# Patient Record
Sex: FEMALE | Race: WHITE | NOT HISPANIC OR LATINO | Employment: FULL TIME | URBAN - METROPOLITAN AREA
[De-identification: names, ages, dates, MRNs, and addresses within clinical notes are randomized per-mention and may not be internally consistent; named-entity substitution may affect disease eponyms.]

---

## 2017-02-10 ENCOUNTER — TELEPHONE (OUTPATIENT)
Dept: PEDIATRICS | Facility: CLINIC | Age: 32
End: 2017-02-10

## 2017-02-10 NOTE — TELEPHONE ENCOUNTER
Patient came into clinic thinking she had an appointment today.  When she was told she scheduled her appointment on the wrong day she was told she could see a triage nurse.    Patient made her appointment through [employer] this morning and she thought  The appointment was today 2/10/17 but it was for 3/10/17.  Appointment notes:  I have redness, swelling, and some pain along the base  of my left big toenail. I believe it is infected.  Also, the nail has not been growing.     Spoke to patient informed her that as an RN I can take a look at her leg and tell her if she needs to be seen by a doctor but I cannot treat her or diagnose her leg infection.  She was very upset and thought I could do something for her.  She told me that this was a waste of time, she knows she has an infection her big toe is bright red. Offered her an appt with Dr. Kellogg Monday was the earliest.  She said no she cant wait that long.  Looked online for wait times at the [facility] at it was 134min.  She lives in [place].  Told her that [facility] had urgent care in [place] and [facility] but they did not have any wait times on their website.  She will go to urgent care to be seen as she needs to be seen.      Keyonna Cervantes RN,   [facility]

## 2018-01-03 ENCOUNTER — OFFICE VISIT (OUTPATIENT)
Dept: PEDIATRICS | Facility: CLINIC | Age: 33
End: 2018-01-03
Payer: COMMERCIAL

## 2018-01-03 VITALS
HEART RATE: 97 BPM | OXYGEN SATURATION: 99 % | BODY MASS INDEX: 33.99 KG/M2 | HEIGHT: 65 IN | WEIGHT: 204 LBS | SYSTOLIC BLOOD PRESSURE: 122 MMHG | DIASTOLIC BLOOD PRESSURE: 80 MMHG

## 2018-01-03 DIAGNOSIS — Z30.011 ENCOUNTER FOR INITIAL PRESCRIPTION OF CONTRACEPTIVE PILLS: ICD-10-CM

## 2018-01-03 DIAGNOSIS — Z00.00 ROUTINE GENERAL MEDICAL EXAMINATION AT A HEALTH CARE FACILITY: Primary | ICD-10-CM

## 2018-01-03 DIAGNOSIS — R73.03 PREDIABETES: ICD-10-CM

## 2018-01-03 DIAGNOSIS — Z13.220 LIPID SCREENING: ICD-10-CM

## 2018-01-03 DIAGNOSIS — E78.5 HYPERLIPIDEMIA LDL GOAL <160: ICD-10-CM

## 2018-01-03 DIAGNOSIS — E66.9 NON MORBID OBESITY, UNSPECIFIED OBESITY TYPE: ICD-10-CM

## 2018-01-03 DIAGNOSIS — E78.1 HYPERTRIGLYCERIDEMIA: ICD-10-CM

## 2018-01-03 DIAGNOSIS — E28.2 PCOS (POLYCYSTIC OVARIAN SYNDROME): ICD-10-CM

## 2018-01-03 LAB
CHOLEST SERPL-MCNC: 171 MG/DL
GLUCOSE SERPL-MCNC: 129 MG/DL (ref 70–99)
HBA1C MFR BLD: 5.6 % (ref 4.3–6)
HDLC SERPL-MCNC: 34 MG/DL
LDLC SERPL CALC-MCNC: ABNORMAL MG/DL
LDLC SERPL DIRECT ASSAY-MCNC: 78 MG/DL
NONHDLC SERPL-MCNC: 137 MG/DL
TRIGL SERPL-MCNC: 457 MG/DL

## 2018-01-03 PROCEDURE — 82947 ASSAY GLUCOSE BLOOD QUANT: CPT | Performed by: INTERNAL MEDICINE

## 2018-01-03 PROCEDURE — 99213 OFFICE O/P EST LOW 20 MIN: CPT | Mod: 25 | Performed by: INTERNAL MEDICINE

## 2018-01-03 PROCEDURE — 80061 LIPID PANEL: CPT | Performed by: INTERNAL MEDICINE

## 2018-01-03 PROCEDURE — 83036 HEMOGLOBIN GLYCOSYLATED A1C: CPT | Performed by: INTERNAL MEDICINE

## 2018-01-03 PROCEDURE — 36415 COLL VENOUS BLD VENIPUNCTURE: CPT | Performed by: INTERNAL MEDICINE

## 2018-01-03 PROCEDURE — 83721 ASSAY OF BLOOD LIPOPROTEIN: CPT | Mod: 59 | Performed by: INTERNAL MEDICINE

## 2018-01-03 PROCEDURE — 99395 PREV VISIT EST AGE 18-39: CPT | Performed by: INTERNAL MEDICINE

## 2018-01-03 RX ORDER — DESOGESTREL AND ETHINYL ESTRADIOL 0.15-0.03
1 KIT ORAL DAILY
Qty: 84 TABLET | Refills: 3 | Status: SHIPPED | OUTPATIENT
Start: 2018-01-03 | End: 2019-01-04

## 2018-01-03 RX ORDER — METFORMIN HCL 500 MG
500 TABLET, EXTENDED RELEASE 24 HR ORAL 2 TIMES DAILY WITH MEALS
Qty: 60 TABLET | Refills: 0 | Status: SHIPPED | OUTPATIENT
Start: 2018-01-03 | End: 2018-02-02

## 2018-01-03 NOTE — MR AVS SNAPSHOT
After Visit Summary   1/3/2018    Nancie Gonzalez    MRN: [id number]           Patient Information     Date Of Birth          1985        Visit Information        Provider Department      1/3/2018 8:10 AM Pauline Kellogg MD PhD [facility]        Today's Diagnoses     Routine general medical examination at a health care facility    -  1    Prediabetes        PCOS (polycystic ovarian syndrome)        Encounter for initial prescription of contraceptive pills        Lipid screening          Care Instructions    Make appointment(s) for:   -- get labs done today.         Medication(s) prescribed today:    Orders Placed This Encounter   Medications     metFORMIN (GLUCOPHAGE-XR) 500 MG 24 hr tablet     Sig: Take 1 tablet (500 mg) by mouth 2 times daily (with meals)     Dispense:  60 tablet     Refill:  0     desogestrel-ethinyl estradiol (APRI) 0.15-30 MG-MCG per tablet     Sig: Take 1 tablet by mouth daily     Dispense:  84 tablet     Refill:  3           Preventive Health Recommendations  Female Ages 26 - 39  Yearly exam:   See your health care provider every year in order to    Review health changes.     Discuss preventive care.      Review your medicines if you your doctor has prescribed any.    Until age 30: Get a Pap test every three years (more often if you have had an abnormal result).    After age 30: Talk to your doctor about whether you should have a Pap test every 3 years or have a Pap test with HPV screening every 5 years.   You do not need a Pap test if your uterus was removed (hysterectomy) and you have not had cancer.  You should be tested each year for STDs (sexually transmitted diseases), if you're at risk.   Talk to your provider about how often to have your cholesterol checked.  If you are at risk for diabetes, you should have a diabetes test (fasting glucose).  Shots: Get a flu shot each year. Get a tetanus shot every 10 years.   Nutrition:     Eat at least 5 servings of  fruits and vegetables each day.    Eat whole-grain bread, whole-wheat pasta and brown rice instead of white grains and rice.    Talk to your provider about Calcium and Vitamin D.     Lifestyle    Exercise at least 150 minutes a week (30 minutes a day, 5 days of the week). This will help you control your weight and prevent disease.    Limit alcohol to one drink per day.    No smoking.     Wear sunscreen to prevent skin cancer.    See your dentist every six months for an exam and cleaning.            Follow-ups after your visit        Who to contact     If you have questions or need follow up information about today's clinic visit or your schedule please contact [facility] directly at [phone].  Normal or non-critical lab and imaging results will be communicated to you by K[employer]ie Kisthart, letter or phone within 4 business days after the clinic has received the results. If you do not hear from us within 7 days, please contact the clinic through [employer] or phone. If you have a critical or abnormal lab result, we will notify you by phone as soon as possible.  Submit refill requests through [employer] or call your pharmacy and they will forward the refill request to us. Please allow 3 business days for your refill to be completed.          Additional Information About Your Visit        K[employer]ie [employer] Information     [employer] gives you secure access to your electronic health record. If you see a primary care provider, you can also send messages to your care team and make appointments. If you have questions, please call your primary care clinic.  If you do not have a primary care provider, please call [phone] and they will assist you.      [employer] is an electronic gateway that provides easy, online access to your medical records. With [employer], you can request a clinic appointment, read your test results, renew a prescription or communicate with your care team.     To access your existing account, please contact your  "[facility] or call [phone] for assistance.        Care EveryWhere ID     This is your Care EveryWhere ID. This could be used by other organizations to access your [place] medical records  [id number]        Your Vitals Were     Pulse Height Last Period Pulse Oximetry BMI (Body Mass Index)       97 5' 4.75\" (1.645 m) 12/07/2017 (Approximate) 99% 34.21 kg/m2        Blood Pressure from Last 3 Encounters:   01/03/18 122/80   12/28/16 122/80   12/29/15 111/80    Weight from Last 3 Encounters:   01/03/18 204 lb (92.5 kg)   12/28/16 189 lb (85.7 kg)   12/29/15 189 lb 3.2 oz (85.8 kg)              We Performed the Following     Glucose     Hemoglobin A1c     Lipid panel reflex to direct LDL Fasting          Today's Medication Changes          These changes are accurate as of: 1/3/18  8:45 AM.  If you have any questions, ask your nurse or doctor.               Start taking these medicines.        Dose/Directions    metFORMIN 500 MG 24 hr tablet   Commonly known as:  GLUCOPHAGE-XR   Used for:  Prediabetes   Replaces:  metFORMIN 500 MG tablet   Started by:  Pauline Kellogg MD PhD        Dose:  500 mg   Take 1 tablet (500 mg) by mouth 2 times daily (with meals)   Quantity:  60 tablet   Refills:  0         Stop taking these medicines if you haven't already. Please contact your care team if you have questions.     metFORMIN 500 MG tablet   Commonly known as:  GLUCOPHAGE   Replaced by:  metFORMIN 500 MG 24 hr tablet   Stopped by:  Pauline Kellogg MD PhD                Where to get your medicines      These medications were sent to [facility] [address] [place] [address] [address] AT [facility] [address], [facility] [address]     Phone:  [phone]     desogestrel-ethinyl estradiol 0.15-30 MG-MCG per tablet    metFORMIN 500 MG 24 hr tablet                Primary Care Provider Office Phone # Fax #    Pauline Kellogg MD PhD [phone]       " [address]  [facility] [address]        Equal Access to Services     REYNALDOCATHERINE ITZEL : Hadii gala ku hadautumno Soglenroyali, waaxda luqadaha, qaybta kahannada maryrosemarie, cristina bazanin hayaadavid hernandezmaria alejandra johnson doug miranda. So [facility] [phone].    ATENCIÓN: Si habla español, tiene a mckinney disposición servicios gratuitos de asistencia lingüística. Llame al [phone].    We comply with applicable federal civil rights laws and [place] laws. We do not discriminate on the basis of race, color, national origin, age, disability, sex, sexual orientation, or gender identity.            Thank you!     Thank you for choosing [facility]  for your care. Our goal is always to provide you with excellent care. Hearing back from our patients is one way we can continue to improve our services. Please take a few minutes to complete the written survey that you may receive in the mail after your visit with us. Thank you!             Your Updated Medication List - Protect others around you: Learn how to safely use, store and throw away your medicines at www.disposemymeds.org.          This list is accurate as of: 1/3/18  8:45 AM.  Always use your most recent med list.                   Brand Name Dispense Instructions for use Diagnosis    desogestrel-ethinyl estradiol 0.15-30 MG-MCG per tablet    APRI    84 tablet    Take 1 tablet by mouth daily    Encounter for initial prescription of contraceptive pills       metFORMIN 500 MG 24 hr tablet    GLUCOPHAGE-XR    60 tablet    Take 1 tablet (500 mg) by mouth 2 times daily (with meals)    Prediabetes

## 2018-01-03 NOTE — PATIENT INSTRUCTIONS
Make appointment(s) for:   -- get labs done today.         Medication(s) prescribed today:    Orders Placed This Encounter   Medications     metFORMIN (GLUCOPHAGE-XR) 500 MG 24 hr tablet     Sig: Take 1 tablet (500 mg) by mouth 2 times daily (with meals)     Dispense:  60 tablet     Refill:  0     desogestrel-ethinyl estradiol (APRI) 0.15-30 MG-MCG per tablet     Sig: Take 1 tablet by mouth daily     Dispense:  84 tablet     Refill:  3           Preventive Health Recommendations  Female Ages 26 - 39  Yearly exam:   See your health care provider every year in order to    Review health changes.     Discuss preventive care.      Review your medicines if you your doctor has prescribed any.    Until age 30: Get a Pap test every three years (more often if you have had an abnormal result).    After age 30: Talk to your doctor about whether you should have a Pap test every 3 years or have a Pap test with HPV screening every 5 years.   You do not need a Pap test if your uterus was removed (hysterectomy) and you have not had cancer.  You should be tested each year for STDs (sexually transmitted diseases), if you're at risk.   Talk to your provider about how often to have your cholesterol checked.  If you are at risk for diabetes, you should have a diabetes test (fasting glucose).  Shots: Get a flu shot each year. Get a tetanus shot every 10 years.   Nutrition:     Eat at least 5 servings of fruits and vegetables each day.    Eat whole-grain bread, whole-wheat pasta and brown rice instead of white grains and rice.    Talk to your provider about Calcium and Vitamin D.     Lifestyle    Exercise at least 150 minutes a week (30 minutes a day, 5 days of the week). This will help you control your weight and prevent disease.    Limit alcohol to one drink per day.    No smoking.     Wear sunscreen to prevent skin cancer.    See your dentist every six months for an exam and cleaning.

## 2018-01-03 NOTE — NURSING NOTE
"Chief Complaint   Patient presents with     Physical       Initial /80  Pulse 97  Ht 5' 4.75\" (1.645 m)  Wt 204 lb (92.5 kg)  LMP 12/07/2017 (Approximate)  SpO2 99%  BMI 34.21 kg/m2 Estimated body mass index is 34.21 kg/(m^2) as calculated from the following:    Height as of this encounter: 5' 4.75\" (1.645 m).    Weight as of this encounter: 204 lb (92.5 kg).  Medication Reconciliation: complete    "

## 2018-01-03 NOTE — PROGRESS NOTES
SUBJECTIVE:   CC: Nancie Gonzalez is an 32 year old woman who presents for preventive health visit.     Healthy Habits:    Do you get at least three servings of calcium containing foods daily (dairy, green leafy vegetables, etc.)? yes    Amount of exercise or daily activities, outside of work: 3-4 day(s) per week    Problems taking medications regularly No    Medication side effects: No    Have you had an eye exam in the past two years? yes    Do you see a dentist twice per year? yes    Do you have sleep apnea, excessive snoring or daytime drowsiness?no    =========  Quit metformin for 6-8 months due to GI side effects.  Patient was using a regular release formulation.  She has since gained over 10 pounds.  Patient has history her PCO S and prediabetes.    -------------------------------------    Today's PHQ-2 Score:   PHQ-2 ( 1999 Pfizer) 1/3/2018 12/29/2015   Q1: Little interest or pleasure in doing things 0 0   Q2: Feeling down, depressed or hopeless 0 0   PHQ-2 Score 0 0       Abuse: Current or Past(Physical, Sexual or Emotional)- No  Do you feel safe in your environment - Yes    Social History   Substance Use Topics     Smoking status: Never Smoker     Smokeless tobacco: Not on file     Alcohol use Yes      Comment: occasional/social     If you drink alcohol do you typically have >3 drinks per day or >7 drinks per week? No                     Reviewed orders with patient.  Reviewed health maintenance and updated orders accordingly - Yes  Labs reviewed in EPIC    Mammogram not appropriate for this patient based on age.    Pertinent mammograms are reviewed under the imaging tab.  History of abnormal Pap smear: NO - age 30- 65 PAP every 3 years recommended    Reviewed and updated as needed this visit by clinical staff  Tobacco  Allergies  Meds  Med Hx  Surg Hx  Fam Hx  Soc Hx        Reviewed and updated as needed this visit by Provider              ROS:  C: NEGATIVE for fever, chills, change in weight  I:  "NEGATIVE for worrisome rashes, moles or lesions  E: NEGATIVE for vision changes or irritation  ENT: NEGATIVE for ear, mouth and throat problems  R: NEGATIVE for significant cough or SOB  B: NEGATIVE for masses, tenderness or discharge  CV: NEGATIVE for chest pain, palpitations or peripheral edema  GI: NEGATIVE for nausea, abdominal pain, heartburn, or change in bowel habits  : NEGATIVE for unusual urinary or vaginal symptoms. Periods are regular.  M: NEGATIVE for significant arthralgias or myalgia  N: NEGATIVE for weakness, dizziness or paresthesias  P: NEGATIVE for changes in mood or affect    OBJECTIVE:   /80  Pulse 97  Ht 5' 4.75\" (1.645 m)  Wt 204 lb (92.5 kg)  LMP 12/07/2017 (Approximate)  SpO2 99%  BMI 34.21 kg/m2  EXAM:  GENERAL: healthy, alert and no distress  EYES: Eyes grossly normal to inspection, PERRL and conjunctivae and sclerae normal  HENT: ear canals and TM's normal, nose and mouth without ulcers or lesions  NECK: no adenopathy, no asymmetry, masses, or scars and thyroid normal to palpation  RESP: lungs clear to auscultation - no rales, rhonchi or wheezes  BREAST: normal without masses, tenderness or nipple discharge and no palpable axillary masses or adenopathy  CV: regular rate and rhythm, normal S1 S2, no S3 or S4, no murmur, click or rub, no peripheral edema and peripheral pulses strong  ABDOMEN: soft, nontender, no hepatosplenomegaly, no masses and bowel sounds normal  MS: no gross musculoskeletal defects noted, no edema  SKIN: no suspicious lesions or rashes  NEURO: Normal strength and tone, mentation intact and speech normal  PSYCH: mentation appears normal, affect normal/bright    Results for orders placed or performed in visit on 01/03/18   Lipid panel reflex to direct LDL Fasting   Result Value Ref Range    Cholesterol 171 <200 mg/dL    Triglycerides 457 (H) <150 mg/dL    HDL Cholesterol 34 (L) >49 mg/dL    LDL Cholesterol Calculated  <100 mg/dL     Cannot estimate LDL when " "triglyceride exceeds 400 mg/dL    Non HDL Cholesterol 137 (H) <130 mg/dL   Hemoglobin A1c   Result Value Ref Range    Hemoglobin A1C 5.6 4.3 - 6.0 %   Glucose   Result Value Ref Range    Glucose 129 (H) 70 - 99 mg/dL   LDL cholesterol direct   Result Value Ref Range    LDL Cholesterol Direct 78 <100 mg/dL         ASSESSMENT/PLAN:       ICD-10-CM    1. Routine general medical examination at a health care facility Z00.00 LDL cholesterol direct   2. Prediabetes R73.03 metFORMIN (GLUCOPHAGE-XR) 500 MG 24 hr tablet     Hemoglobin A1c     Glucose     Glucose     Hemoglobin A1c   3. PCOS (polycystic ovarian syndrome) E28.2    4. Encounter for initial prescription of contraceptive pills Z30.011 desogestrel-ethinyl estradiol (APRI) 0.15-30 MG-MCG per tablet   5. Lipid screening Z13.220 Lipid panel reflex to direct LDL Fasting   6. Hyperlipidemia LDL goal <160 E78.5 Lipid panel reflex to direct LDL Fasting   7. Hypertriglyceridemia E78.1 Lipid panel reflex to direct LDL Fasting   8. Non morbid obesity, unspecified obesity type E66.9      --Recommended restarting metformin with extended release form.  Her glucose today is in the diabetes range, but the A1c for the last 3 months is still within normal limits.  We will plan to recheck labs in 3 months.  --Hyperlipidemia/hypertriglyceridemia: Patient will work on diet and exercise, weight loss, restarting metformin for better glucose control.  Recheck in 3 months as well.  Will consider medication if no improvement then.    COUNSELING:   Reviewed preventive health counseling, as reflected in patient instructions         reports that she has never smoked. She does not have any smokeless tobacco history on file.    Estimated body mass index is 34.21 kg/(m^2) as calculated from the following:    Height as of this encounter: 5' 4.75\" (1.645 m).    Weight as of this encounter: 204 lb (92.5 kg).   Weight management plan: restart metformin    Counseling Resources:  ATP IV " Guidelines  Pooled Cohorts Equation Calculator  Breast Cancer Risk Calculator  FRAX Risk Assessment  ICSI Preventive Guidelines  Dietary Guidelines for Americans, 2010  [employer]'s MyPlate  ASA Prophylaxis  Lung CA Screening    Pauline Kellogg MD PhD  [facility]

## 2018-01-04 NOTE — PROGRESS NOTES
Dear Nancie,   Here are your recent results.   -- triglycerides are much higher than last year. Glucose is in the diabetes range even though A1c is still normal.   -- restart metformin and recheck in 3 months. If Triglycerides do not improve, we'll need to consider medications.  -- please schedule follow up appointment and fasting labs in 3 months.     Please call or Mychart to our office if you have further questions.     Pauline Kellogg MD-PhD

## 2018-02-02 DIAGNOSIS — R73.03 PREDIABETES: ICD-10-CM

## 2018-02-02 RX ORDER — METFORMIN HCL 500 MG
500 TABLET, EXTENDED RELEASE 24 HR ORAL 2 TIMES DAILY WITH MEALS
Qty: 180 TABLET | Refills: 0 | Status: SHIPPED | OUTPATIENT
Start: 2018-02-02 | End: 2018-04-30

## 2018-02-02 NOTE — TELEPHONE ENCOUNTER
metFORMIN (GLUCOPHAGE-XR) 500 MG 24 hr tablet    Last Written Prescription Date:  1/3/18  Last Fill Quantity: 60 tab,  # refills: 0   Last Office Visit with [facility] or [facility] primary care provider:  1/3/18   Future Office Visit:

## 2018-03-22 ENCOUNTER — TRANSFERRED RECORDS (OUTPATIENT)
Dept: HEALTH INFORMATION MANAGEMENT | Facility: CLINIC | Age: 33
End: 2018-03-22

## 2018-04-30 DIAGNOSIS — R73.03 PREDIABETES: ICD-10-CM

## 2018-04-30 NOTE — TELEPHONE ENCOUNTER
METFORMIN ER 500MG 24 HR TABS  Last Written Prescription Date:  02/02/2018  Last Fill Quantity: 180,  # refills: 0   Last office visit: 1/3/2018 with prescribing provider:    Last refill per Pharmacy   Future Office Visit:

## 2018-04-30 NOTE — LETTER
May 1, 2018      Nancie Gonzalez  [address] [place] [address]              Dear Nancie,      We recently received a call from your pharmacy requesting a refill of your medication. We have provided a 30 day refill of your medication, metFORMIN (GLUCOPHAGE-XR) 500 MG 24 hr tablet, as requested.      A review of your chart indicates that your last office visit was on 1/3/2018.  At this appointment, the above medication was restarted and Dr. Kellogg advised returning to the clinic in 3 months time to recheck.    We have authorized a one time refill of your medication to allow time for you to schedule.     If you have a history of diabetes or high cholesterol, please come in fasting for the appointment. Fasting entails nothing to eat or drink 8 hours prior to your appointment; with the exception on water. You may take your medication the day of the appointment.    Please call the clinic to schedule your appointment.    Thank you for taking an active role in your healthcare.      Sincerely,    [facility]

## 2018-05-01 RX ORDER — METFORMIN HCL 500 MG
500 TABLET, EXTENDED RELEASE 24 HR ORAL 2 TIMES DAILY WITH MEALS
Qty: 60 TABLET | Refills: 0 | Status: SHIPPED | OUTPATIENT
Start: 2018-05-01 | End: 2021-03-29

## 2018-05-01 NOTE — TELEPHONE ENCOUNTER
metFORMIN (GLUCOPHAGE-XR) 500 MG 24 hr tablet 180 tablet 0 2/2/2018  No   Sig: Take 1 tablet (500 mg) by mouth 2 times daily (with meals)     Last OV with Dr. JEAN: 1/3/2018  --Recommended restarting metformin with extended release form.  Her glucose today is in the diabetes range, but the A1c for the last 3 months is still within normal limits.  We will plan to recheck labs in 3 months.  --Hyperlipidemia/hypertriglyceridemia: Patient will work on diet and exercise, weight loss, restarting metformin for better glucose control.  Recheck in 3 months as well.  Will consider medication if no improvement then    Biguanide Agents Failed4/30 11:28 AM   Patient has had a Microalbumin in the past 12 mos.    Blood pressure less than 140/90 in past 6 months    Patient has documented LDL within the past 12 mos.    Patient is age 10 or older    Patient has documented A1c within the specified period of time.    Patient's CR is NOT>1.4 OR Patient's EGFR is NOT<45 within past 12 mos.    Patient does NOT have a diagnosis of CHF.    Patient is not pregnant    Patient has not had a positive pregnancy test within the past 12 mos.     Recent (6 mo) or future (30 days) visit within the authorizing provider's specialty     Patient due for diabetes check/labs. Winifred refill. Patient notified by letter. Otilia Robertson RN

## 2018-05-26 DIAGNOSIS — Z30.011 ENCOUNTER FOR INITIAL PRESCRIPTION OF CONTRACEPTIVE PILLS: ICD-10-CM

## 2018-05-30 RX ORDER — DESOGESTREL AND ETHINYL ESTRADIOL 0.15-0.03
KIT ORAL
Qty: 84 TABLET | Refills: 0 | OUTPATIENT
Start: 2018-05-30

## 2018-05-30 NOTE — TELEPHONE ENCOUNTER
desogestrel-ethinyl estradiol (APRI) 0.15-30 MG-MCG per tablet 84 tablet 3 1/3/2018     Sig - Route: Take 1 tablet by mouth daily - Oral        Contraceptives Protocol Passed5/26 1:07 PM   Patient is not a current smoker if age is 35 or older    Recent (12 mo) or future (30 days) visit within the authorizing provider's specialty    No active pregnancy on record    No positive pregnancy test in past 12 months     Our records indicate duplicate request. Same requesting pharmacy. Otilia Robertson RN

## 2019-01-04 ENCOUNTER — OFFICE VISIT (OUTPATIENT)
Dept: PEDIATRICS | Facility: CLINIC | Age: 34
End: 2019-01-04
Payer: COMMERCIAL

## 2019-01-04 VITALS
OXYGEN SATURATION: 99 % | BODY MASS INDEX: 32.96 KG/M2 | WEIGHT: 197.8 LBS | TEMPERATURE: 98.2 F | HEART RATE: 78 BPM | HEIGHT: 65 IN | SYSTOLIC BLOOD PRESSURE: 126 MMHG | DIASTOLIC BLOOD PRESSURE: 85 MMHG

## 2019-01-04 DIAGNOSIS — Z11.4 SCREENING FOR HUMAN IMMUNODEFICIENCY VIRUS: ICD-10-CM

## 2019-01-04 DIAGNOSIS — Z13.1 SCREENING FOR DIABETES MELLITUS (DM): ICD-10-CM

## 2019-01-04 DIAGNOSIS — Z13.6 CARDIOVASCULAR SCREENING; LDL GOAL LESS THAN 160: ICD-10-CM

## 2019-01-04 DIAGNOSIS — E66.9 OBESITY (BMI 30-39.9): ICD-10-CM

## 2019-01-04 DIAGNOSIS — Z13.29 SCREENING FOR THYROID DISORDER: ICD-10-CM

## 2019-01-04 DIAGNOSIS — Z13.0 SCREENING FOR DEFICIENCY ANEMIA: ICD-10-CM

## 2019-01-04 DIAGNOSIS — Z30.011 ENCOUNTER FOR INITIAL PRESCRIPTION OF CONTRACEPTIVE PILLS: ICD-10-CM

## 2019-01-04 DIAGNOSIS — Z00.00 ROUTINE GENERAL MEDICAL EXAMINATION AT A HEALTH CARE FACILITY: Primary | ICD-10-CM

## 2019-01-04 DIAGNOSIS — R73.03 PREDIABETES: ICD-10-CM

## 2019-01-04 DIAGNOSIS — Z12.4 SCREENING FOR CERVICAL CANCER: ICD-10-CM

## 2019-01-04 LAB
ALBUMIN SERPL-MCNC: 3.3 G/DL (ref 3.4–5)
ALP SERPL-CCNC: 60 U/L (ref 40–150)
ALT SERPL W P-5'-P-CCNC: 11 U/L (ref 0–50)
ANION GAP SERPL CALCULATED.3IONS-SCNC: 8 MMOL/L (ref 3–14)
AST SERPL W P-5'-P-CCNC: 11 U/L (ref 0–45)
BASOPHILS # BLD AUTO: 0 10E9/L (ref 0–0.2)
BASOPHILS NFR BLD AUTO: 0.4 %
BILIRUB SERPL-MCNC: 0.2 MG/DL (ref 0.2–1.3)
BUN SERPL-MCNC: 9 MG/DL (ref 7–30)
CALCIUM SERPL-MCNC: 8.6 MG/DL (ref 8.5–10.1)
CHLORIDE SERPL-SCNC: 110 MMOL/L (ref 94–109)
CHOLEST SERPL-MCNC: 176 MG/DL
CO2 SERPL-SCNC: 21 MMOL/L (ref 20–32)
CREAT SERPL-MCNC: 0.7 MG/DL (ref 0.52–1.04)
DIFFERENTIAL METHOD BLD: NORMAL
EOSINOPHIL # BLD AUTO: 0.1 10E9/L (ref 0–0.7)
EOSINOPHIL NFR BLD AUTO: 1 %
ERYTHROCYTE [DISTWIDTH] IN BLOOD BY AUTOMATED COUNT: 12 % (ref 10–15)
GFR SERPL CREATININE-BSD FRML MDRD: >90 ML/MIN/{1.73_M2}
GLUCOSE SERPL-MCNC: 113 MG/DL (ref 70–99)
HBA1C MFR BLD: 5.8 % (ref 0–5.6)
HCT VFR BLD AUTO: 41.4 % (ref 35–47)
HDLC SERPL-MCNC: 42 MG/DL
HGB BLD-MCNC: 13.9 G/DL (ref 11.7–15.7)
HIV 1+2 AB+HIV1 P24 AG SERPL QL IA: NONREACTIVE
IMM GRANULOCYTES # BLD: 0 10E9/L (ref 0–0.4)
IMM GRANULOCYTES NFR BLD: 0.4 %
LDLC SERPL CALC-MCNC: 71 MG/DL
LYMPHOCYTES # BLD AUTO: 2.8 10E9/L (ref 0.8–5.3)
LYMPHOCYTES NFR BLD AUTO: 35.3 %
MCH RBC QN AUTO: 27.5 PG (ref 26.5–33)
MCHC RBC AUTO-ENTMCNC: 33.6 G/DL (ref 31.5–36.5)
MCV RBC AUTO: 82 FL (ref 78–100)
MONOCYTES # BLD AUTO: 0.5 10E9/L (ref 0–1.3)
MONOCYTES NFR BLD AUTO: 6.7 %
NEUTROPHILS # BLD AUTO: 4.5 10E9/L (ref 1.6–8.3)
NEUTROPHILS NFR BLD AUTO: 56.2 %
NONHDLC SERPL-MCNC: 134 MG/DL
PLATELET # BLD AUTO: 234 10E9/L (ref 150–450)
POTASSIUM SERPL-SCNC: 4.3 MMOL/L (ref 3.4–5.3)
PROT SERPL-MCNC: 7 G/DL (ref 6.8–8.8)
RBC # BLD AUTO: 5.06 10E12/L (ref 3.8–5.2)
SODIUM SERPL-SCNC: 139 MMOL/L (ref 133–144)
TRIGL SERPL-MCNC: 314 MG/DL
TSH SERPL DL<=0.005 MIU/L-ACNC: 3.67 MU/L (ref 0.4–4)
WBC # BLD AUTO: 8.1 10E9/L (ref 4–11)

## 2019-01-04 PROCEDURE — 84443 ASSAY THYROID STIM HORMONE: CPT | Performed by: FAMILY MEDICINE

## 2019-01-04 PROCEDURE — 36415 COLL VENOUS BLD VENIPUNCTURE: CPT | Performed by: FAMILY MEDICINE

## 2019-01-04 PROCEDURE — G0145 SCR C/V CYTO,THINLAYER,RESCR: HCPCS | Performed by: FAMILY MEDICINE

## 2019-01-04 PROCEDURE — 87624 HPV HI-RISK TYP POOLED RSLT: CPT | Performed by: FAMILY MEDICINE

## 2019-01-04 PROCEDURE — 85025 COMPLETE CBC W/AUTO DIFF WBC: CPT | Performed by: FAMILY MEDICINE

## 2019-01-04 PROCEDURE — 83036 HEMOGLOBIN GLYCOSYLATED A1C: CPT | Performed by: FAMILY MEDICINE

## 2019-01-04 PROCEDURE — 87389 HIV-1 AG W/HIV-1&-2 AB AG IA: CPT | Performed by: FAMILY MEDICINE

## 2019-01-04 PROCEDURE — 80061 LIPID PANEL: CPT | Performed by: FAMILY MEDICINE

## 2019-01-04 PROCEDURE — 80053 COMPREHEN METABOLIC PANEL: CPT | Performed by: FAMILY MEDICINE

## 2019-01-04 PROCEDURE — 99395 PREV VISIT EST AGE 18-39: CPT | Performed by: FAMILY MEDICINE

## 2019-01-04 RX ORDER — DESOGESTREL AND ETHINYL ESTRADIOL 0.15-0.03
1 KIT ORAL DAILY
Qty: 84 TABLET | Refills: 3 | Status: SHIPPED | OUTPATIENT
Start: 2019-01-04 | End: 2020-01-03

## 2019-01-04 ASSESSMENT — MIFFLIN-ST. JEOR: SCORE: 1593.09

## 2019-01-04 ASSESSMENT — PAIN SCALES - GENERAL: PAINLEVEL: NO PAIN (0)

## 2019-01-04 NOTE — PROGRESS NOTES
SUBJECTIVE:   CC: Nancie Gonzalez is an 33 year old woman who presents for preventive health visit.       Patient is new to the provider, is here for annual physical exam    Physical   Annual:     Getting at least 3 servings of Calcium per day:  Yes    Bi-annual eye exam:  Yes    Dental care twice a year:  Yes    Sleep apnea or symptoms of sleep apnea:  None    Diet:  Vegetarian/vegan    Frequency of exercise:  2-3 days/week    Duration of exercise:  30-45 minutes    Taking medications regularly:  Yes    Medication side effects:  Other    Additional concerns today:  No    PHQ-2 Total Score: 0    Medication Follow up - discuss Metformin and side effects. Patient reports frequent diarrhea when taking it.            Today's PHQ-2 Score:   PHQ-2 ( 1999 [employer]) 1/4/2019   Q1: Little interest or pleasure in doing things 0   Q2: Feeling down, depressed or hopeless 0   PHQ-2 Score 0   Q1: Little interest or pleasure in doing things Not at all   Q2: Feeling down, depressed or hopeless Not at all   PHQ-2 Score 0       Abuse: Current or Past(Physical, Sexual or Emotional)- No  Do you feel safe in your environment? Yes    Social History     Tobacco Use     Smoking status: Never Smoker     Smokeless tobacco: Never Used   Substance Use Topics     Alcohol use: Yes     Comment: occasional/social     Alcohol Use 1/4/2019   If you drink alcohol do you typically have greater than 3 drinks per day OR greater than 7 drinks per week? No       Reviewed orders with patient.  Reviewed health maintenance and updated orders accordingly - Yes  Labs reviewed in EPIC  BP Readings from Last 3 Encounters:   01/04/19 126/85   01/03/18 122/80   12/28/16 122/80    Wt Readings from Last 3 Encounters:   01/04/19 89.7 kg (197 lb 12.8 oz)   01/03/18 92.5 kg (204 lb)   12/28/16 85.7 kg (189 lb)                  Patient Active Problem List   Diagnosis     History of abnormal cervical Pap smear     History of HPV infection     CARDIOVASCULAR SCREENING;  LDL GOAL LESS THAN 160     Non morbid obesity, unspecified obesity type     Dysplasia of cervix, low grade (IVETT 1)     Past Surgical History:   Procedure Laterality Date     COLPOSCOPY CERVIX, BIOPSY CERVIX, ENDOCERVICAL CURETTAGE, COMBINED  6/24/2008 2011       Social History     Tobacco Use     Smoking status: Never Smoker     Smokeless tobacco: Never Used   Substance Use Topics     Alcohol use: Yes     Comment: occasional/social     Family History   Problem Relation Age of Onset     Hyperlipidemia Mother      Hypertension Father      Hyperlipidemia Brother          Current Outpatient Medications   Medication Sig Dispense Refill     desogestrel-ethinyl estradiol (APRI) 0.15-30 MG-MCG tablet Take 1 tablet by mouth daily 84 tablet 3     metFORMIN (GLUCOPHAGE-XR) 500 MG 24 hr tablet Take 1 tablet (500 mg) by mouth 2 times daily (with meals) 60 tablet 0     Allergies   Allergen Reactions     Amoxicillin Anaphylaxis     Penicillins Anaphylaxis     Recent Labs   Lab Test 01/04/19  0913 01/03/18  0846 12/28/16  0951   A1C 5.8* 5.6 5.4   LDL 71 Cannot estimate LDL when triglyceride exceeds 400 mg/dL  78 65   HDL 42* 34* 46*   TRIG 314* 457* 287*   ALT 11  --   --    CR 0.70  --   --    GFRESTIMATED >90  --   --    GFRESTBLACK >90  --   --    POTASSIUM 4.3  --   --    TSH 3.67  --   --         Mammogram not appropriate for this patient based on age.    Pertinent mammograms are reviewed under the imaging tab.  History of abnormal Pap smear: NO - age 30- 65 PAP every 3 years recommended  PAP / HPV Latest Ref Rng & Units 12/29/2015   PAP - NIL   HPV 16 DNA NEG Negative   HPV 18 DNA NEG Negative   OTHER HR HPV NEG Negative     Reviewed and updated as needed this visit by clinical staff  Tobacco  Allergies  Meds  Med Hx  Surg Hx  Fam Hx  Soc Hx        Reviewed and updated as needed this visit by Provider          Past Medical History:   Diagnosis Date     IVETT I (cervical intraepithelial neoplasia I)     Pt reported     "  Past Surgical History:   Procedure Laterality Date     COLPOSCOPY CERVIX, BIOPSY CERVIX, ENDOCERVICAL CURETTAGE, COMBINED  6/24/2008 2011     Obstetric History     No data available          Review of Systems  CONSTITUTIONAL: NEGATIVE for fever, chills, change in weight  INTEGUMENTARU/SKIN: NEGATIVE for worrisome rashes, moles or lesions  EYES: NEGATIVE for vision changes or irritation  ENT: NEGATIVE for ear, mouth and throat problems  RESP: NEGATIVE for significant cough or SOB  BREAST: NEGATIVE for masses, tenderness or discharge  CV: NEGATIVE for chest pain, palpitations or peripheral edema  GI: NEGATIVE for nausea, abdominal pain, heartburn, or change in bowel habits  : NEGATIVE for unusual urinary or vaginal symptoms. Periods are regular.  MUSCULOSKELETAL: NEGATIVE for significant arthralgias or myalgia  NEURO: NEGATIVE for weakness, dizziness or paresthesias  ENDOCRINE: NEGATIVE for temperature intolerance, skin/hair changes  ENDOCRINE: History of prediabetes  HEME/ALLERGY/IMMUNE: NEGATIVE for bleeding problems  PSYCHIATRIC: NEGATIVE for changes in mood or affect     OBJECTIVE:   /85 (BP Location: Right arm, Patient Position: Sitting, Cuff Size: Adult Large)   Pulse 78   Temp 98.2  F (36.8  C) (Oral)   Ht 1.651 m (5' 5\")   Wt 89.7 kg (197 lb 12.8 oz)   SpO2 99%   BMI 32.92 kg/m    Physical Exam  GENERAL: healthy, alert and no distress  EYES: Eyes grossly normal to inspection, PERRL and conjunctivae and sclerae normal  HENT: ear canals and TM's normal, nose and mouth without ulcers or lesions  NECK: no adenopathy, no asymmetry, masses, or scars and thyroid normal to palpation  RESP: lungs clear to auscultation - no rales, rhonchi or wheezes  BREAST: normal without masses, tenderness or nipple discharge and no palpable axillary masses or adenopathy  CV: regular rate and rhythm, normal S1 S2, no S3 or S4, no murmur, click or rub, no peripheral edema and peripheral pulses strong  ABDOMEN: soft, " nontender, no hepatosplenomegaly, no masses and bowel sounds normal   (female): normal female external genitalia, normal urethral meatus, vaginal mucosa pink, moist, well rugated, and normal cervix/adnexa/uterus without masses or discharge  MS: no gross musculoskeletal defects noted, no edema  SKIN: no suspicious lesions or rashes  NEURO: Normal strength and tone, mentation intact and speech normal  PSYCH: mentation appears normal, affect normal/bright    Diagnostic Test Results:  Results for orders placed or performed in visit on 01/04/19 (from the past 24 hour(s))   CBC with platelets and differential   Result Value Ref Range    WBC 8.1 4.0 - 11.0 10e9/L    RBC Count 5.06 3.8 - 5.2 10e12/L    Hemoglobin 13.9 11.7 - 15.7 g/dL    Hematocrit 41.4 35.0 - 47.0 %    MCV 82 78 - 100 fl    MCH 27.5 26.5 - 33.0 pg    MCHC 33.6 31.5 - 36.5 g/dL    RDW 12.0 10.0 - 15.0 %    Platelet Count 234 150 - 450 10e9/L    % Neutrophils 56.2 %    % Lymphocytes 35.3 %    % Monocytes 6.7 %    % Eosinophils 1.0 %    % Basophils 0.4 %    % Immature Granulocytes 0.4 %    Absolute Neutrophil 4.5 1.6 - 8.3 10e9/L    Absolute Lymphocytes 2.8 0.8 - 5.3 10e9/L    Absolute Monocytes 0.5 0.0 - 1.3 10e9/L    Absolute Eosinophils 0.1 0.0 - 0.7 10e9/L    Absolute Basophils 0.0 0.0 - 0.2 10e9/L    Abs Immature Granulocytes 0.0 0 - 0.4 10e9/L    Diff Method Automated Method    Comprehensive metabolic panel   Result Value Ref Range    Sodium 139 133 - 144 mmol/L    Potassium 4.3 3.4 - 5.3 mmol/L    Chloride 110 (H) 94 - 109 mmol/L    Carbon Dioxide 21 20 - 32 mmol/L    Anion Gap 8 3 - 14 mmol/L    Glucose 113 (H) 70 - 99 mg/dL    Urea Nitrogen 9 7 - 30 mg/dL    Creatinine 0.70 0.52 - 1.04 mg/dL    GFR Estimate >90 >60 mL/min/[1.73_m2]    GFR Estimate If Black >90 >60 mL/min/[1.73_m2]    Calcium 8.6 8.5 - 10.1 mg/dL    Bilirubin Total 0.2 0.2 - 1.3 mg/dL    Albumin 3.3 (L) 3.4 - 5.0 g/dL    Protein Total 7.0 6.8 - 8.8 g/dL    Alkaline Phosphatase 60 40  - 150 U/L    ALT 11 0 - 50 U/L    AST 11 0 - 45 U/L   TSH with free T4 reflex   Result Value Ref Range    TSH 3.67 0.40 - 4.00 mU/L   Hemoglobin A1c   Result Value Ref Range    Hemoglobin A1C 5.8 (H) 0 - 5.6 %   Lipid panel reflex to direct LDL Fasting   Result Value Ref Range    Cholesterol 176 <200 mg/dL    Triglycerides 314 (H) <150 mg/dL    HDL Cholesterol 42 (L) >49 mg/dL    LDL Cholesterol Calculated 71 <100 mg/dL    Non HDL Cholesterol 134 (H) <130 mg/dL       ASSESSMENT/PLAN:   1. Routine general medical examination at a health care facility  Discussed on regular exercises, healthy eating, self breast exams monthly and routine dental checks.  - CBC with platelets and differential  - Comprehensive metabolic panel  - TSH with free T4 reflex  - Hemoglobin A1c  - Lipid panel reflex to direct LDL Fasting  - HIV Antigen Antibody Combo    - Pap imaged thin layer screen with HPV - recommended age 30 - 65 years (select HPV order below)  - HPV High Risk Types DNA Cervical    2. Encounter for initial prescription of contraceptive pills    - desogestrel-ethinyl estradiol (APRI) 0.15-30 MG-MCG tablet; Take 1 tablet by mouth daily  Dispense: 84 tablet; Refill: 3      3. Prediabetes  Lab Results   Component Value Date    A1C 5.8 01/04/2019    A1C 5.6 01/03/2018    A1C 5.4 12/28/2016    A1C 5.5 03/31/2016     Glucose   Date Value Ref Range Status   01/04/2019 113 (H) 70 - 99 mg/dL Final     Comment:     Fasting specimen   01/03/2018 129 (H) 70 - 99 mg/dL Final     Comment:     Fasting specimen   12/28/2016 108 (H) 70 - 99 mg/dL Final     Comment:     Fasting specimen   03/31/2016 105 (H) 70 - 99 mg/dL Final   12/29/2015 112 (H) 70 - 99 mg/dL Final     Patient has significant diarrhea 1-2 episodes right after she takes the metformin.  Patient was switched from immediate release to extended release of metformin to avoid the side effect, which she still suffers from.  Recommended patient to stop taking the  "metformin  Emphasized on weight loss, portion control, healthy eating, stop drinking Diet Coke  Follow-up for recheck with PCP in 3 months along with labs to see the response without medicine  Patient verbalised understanding and is agreeable to the plan.      4. Screening for cervical cancer    - Pap imaged thin layer screen with HPV - recommended age 30 - 65 years (select HPV order below)  - HPV High Risk Types DNA Cervical    5. CARDIOVASCULAR SCREENING; LDL GOAL LESS THAN 160  LDL Cholesterol Calculated   Date Value Ref Range Status   01/04/2019 71 <100 mg/dL Final     Comment:     Desirable:       <100 mg/dl         (Z11.4) Screening for human immunodeficiency virus  Comment:   Plan: HIV Antigen Antibody Combo            (Z13.0) Screening for deficiency anemia  Comment:   Plan: CBC with platelets and differential                (Z13.29) Screening for thyroid disorder  Comment:   Plan: TSH with free T4 reflex            (Z13.1) Screening for diabetes mellitus (DM)  Comment:   Plan: Comprehensive metabolic panel, Hemoglobin A1c                6. Obesity (BMI 30-39.9)  Wt Readings from Last 5 Encounters:   01/04/19 89.7 kg (197 lb 12.8 oz)   01/03/18 92.5 kg (204 lb)   12/28/16 85.7 kg (189 lb)   12/29/15 85.8 kg (189 lb 3.2 oz)     Emphasized on weight loss, portion control, low calorie and low fat diet, healthy eating, regular exercises.        COUNSELING:  Reviewed preventive health counseling, as reflected in patient instructions  Special attention given to:        Regular exercise       Healthy diet/nutrition       Vision screening       Contraception       Family planning       Folic Acid Counseling    BP Readings from Last 1 Encounters:   01/04/19 126/85     Estimated body mass index is 32.92 kg/m  as calculated from the following:    Height as of this encounter: 1.651 m (5' 5\").    Weight as of this encounter: 89.7 kg (197 lb 12.8 oz).    BP Screening:   Last 3 BP Readings:    BP Readings from Last 3 " Encounters:   01/04/19 126/85   01/03/18 122/80   12/28/16 122/80       The following was recommended to the patient:  Re-screen BP within a year and recommended lifestyle modifications  Weight management plan: Discussed healthy diet and exercise guidelines     reports that  has never smoked. she has never used smokeless tobacco.      Counseling Resources:  ATP IV Guidelines  Pooled Cohorts Equation Calculator  Breast Cancer Risk Calculator  FRAX Risk Assessment  ICSI Preventive Guidelines  Dietary Guidelines for Americans, 2010  USDA's MyPlate  ASA Prophylaxis  Lung CA Screening    Boo Cazares MD  [facility]  Chart documentation done in part with Dragon Voice recognition Software. Although reviewed after completion, some word and grammatical error may remain.  Chart forwarded to PCP for FYI

## 2019-01-05 NOTE — RESULT ENCOUNTER NOTE
Dimas Canada,  Your lab tests showed normal results for thyroid functions, liver and kidney functions, hemoglobin,and blood counts. Your prediabetes labs and fasting glucose are elevated as in prediabetes range. Your fasting triglycerides are elevated, slightly better than before. As we discussed at the visit, you may hold the metformin for now, start in regular exercise routine including brisk walking for atleast 30 minutes and follow a diet in low in sugar and calories, follow up internal jugular the ofifce in 3 months for recheck.  Your test results for HIV screening is negative, this is good and reassuring.   Let me know if you have any questions. Take care.  Boo Cazares MD

## 2019-01-07 LAB
COPATH REPORT: NORMAL
PAP: NORMAL

## 2019-01-09 LAB
FINAL DIAGNOSIS: NORMAL
HPV HR 12 DNA CVX QL NAA+PROBE: NEGATIVE
HPV16 DNA SPEC QL NAA+PROBE: NEGATIVE
HPV18 DNA SPEC QL NAA+PROBE: NEGATIVE
SPECIMEN DESCRIPTION: NORMAL
SPECIMEN SOURCE CVX/VAG CYTO: NORMAL

## 2019-01-25 DIAGNOSIS — Z30.011 ENCOUNTER FOR INITIAL PRESCRIPTION OF CONTRACEPTIVE PILLS: ICD-10-CM

## 2019-01-25 NOTE — TELEPHONE ENCOUNTER
ISIBLOOM 0.15MG-O.O3MG TABLETS 28S    Last Written Prescription Date:  11/02/2018  Last Fill Quantity: 84 TABLETS,  # refills: 3   Last office visit: 1/4/2019 with prescribing provider:    Last refill per Pharmacy   Future Office Visit:      Dave Ha CMA ([facility])

## 2019-01-29 RX ORDER — DESOGESTREL AND ETHINYL ESTRADIOL 0.15-0.03
1 KIT ORAL DAILY
Qty: 84 TABLET | Refills: 3 | OUTPATIENT
Start: 2019-01-29

## 2019-11-06 ENCOUNTER — HEALTH MAINTENANCE LETTER (OUTPATIENT)
Age: 34
End: 2019-11-06

## 2019-12-21 DIAGNOSIS — Z00.00 ROUTINE GENERAL MEDICAL EXAMINATION AT A HEALTH CARE FACILITY: Primary | ICD-10-CM

## 2019-12-21 DIAGNOSIS — Z13.6 CARDIOVASCULAR SCREENING; LDL GOAL LESS THAN 160: ICD-10-CM

## 2019-12-21 DIAGNOSIS — Z13.1 SCREENING FOR DIABETES MELLITUS (DM): ICD-10-CM

## 2019-12-21 DIAGNOSIS — E78.1 HYPERTRIGLYCERIDEMIA: ICD-10-CM

## 2019-12-21 DIAGNOSIS — R73.01 ELEVATED FASTING GLUCOSE: ICD-10-CM

## 2020-01-03 ENCOUNTER — OFFICE VISIT (OUTPATIENT)
Dept: PEDIATRICS | Facility: CLINIC | Age: 35
End: 2020-01-03
Payer: COMMERCIAL

## 2020-01-03 VITALS
HEART RATE: 73 BPM | TEMPERATURE: 98.6 F | OXYGEN SATURATION: 100 % | HEIGHT: 65 IN | SYSTOLIC BLOOD PRESSURE: 112 MMHG | WEIGHT: 183.9 LBS | BODY MASS INDEX: 30.64 KG/M2 | DIASTOLIC BLOOD PRESSURE: 80 MMHG

## 2020-01-03 DIAGNOSIS — Z13.6 CARDIOVASCULAR SCREENING; LDL GOAL LESS THAN 160: ICD-10-CM

## 2020-01-03 DIAGNOSIS — E66.9 OBESITY (BMI 30-39.9): ICD-10-CM

## 2020-01-03 DIAGNOSIS — R73.01 ELEVATED FASTING GLUCOSE: ICD-10-CM

## 2020-01-03 DIAGNOSIS — Z00.00 ROUTINE GENERAL MEDICAL EXAMINATION AT A HEALTH CARE FACILITY: ICD-10-CM

## 2020-01-03 DIAGNOSIS — Z13.1 SCREENING FOR DIABETES MELLITUS (DM): ICD-10-CM

## 2020-01-03 DIAGNOSIS — Z00.00 ROUTINE GENERAL MEDICAL EXAMINATION AT A HEALTH CARE FACILITY: Primary | ICD-10-CM

## 2020-01-03 DIAGNOSIS — E78.5 DYSLIPIDEMIA: ICD-10-CM

## 2020-01-03 DIAGNOSIS — Z30.011 ENCOUNTER FOR INITIAL PRESCRIPTION OF CONTRACEPTIVE PILLS: ICD-10-CM

## 2020-01-03 DIAGNOSIS — E28.2 PCOS (POLYCYSTIC OVARIAN SYNDROME): ICD-10-CM

## 2020-01-03 DIAGNOSIS — Z23 NEED FOR PROPHYLACTIC VACCINATION AND INOCULATION AGAINST INFLUENZA: ICD-10-CM

## 2020-01-03 DIAGNOSIS — E78.1 HYPERTRIGLYCERIDEMIA: ICD-10-CM

## 2020-01-03 DIAGNOSIS — Z12.4 CERVICAL CANCER SCREENING: ICD-10-CM

## 2020-01-03 LAB
ANION GAP SERPL CALCULATED.3IONS-SCNC: 5 MMOL/L (ref 3–14)
BUN SERPL-MCNC: 12 MG/DL (ref 7–30)
CALCIUM SERPL-MCNC: 8.5 MG/DL (ref 8.5–10.1)
CHLORIDE SERPL-SCNC: 108 MMOL/L (ref 94–109)
CHOLEST SERPL-MCNC: 213 MG/DL
CO2 SERPL-SCNC: 27 MMOL/L (ref 20–32)
CREAT SERPL-MCNC: 0.74 MG/DL (ref 0.52–1.04)
GFR SERPL CREATININE-BSD FRML MDRD: >90 ML/MIN/{1.73_M2}
GLUCOSE SERPL-MCNC: 112 MG/DL (ref 70–99)
HBA1C MFR BLD: 5.2 % (ref 0–5.6)
HDLC SERPL-MCNC: 42 MG/DL
LDLC SERPL CALC-MCNC: ABNORMAL MG/DL
LDLC SERPL DIRECT ASSAY-MCNC: 122 MG/DL
NONHDLC SERPL-MCNC: 171 MG/DL
POTASSIUM SERPL-SCNC: 4.3 MMOL/L (ref 3.4–5.3)
SODIUM SERPL-SCNC: 140 MMOL/L (ref 133–144)
TRIGL SERPL-MCNC: 403 MG/DL
TSH SERPL DL<=0.005 MIU/L-ACNC: 3.44 MU/L (ref 0.4–4)

## 2020-01-03 PROCEDURE — 83721 ASSAY OF BLOOD LIPOPROTEIN: CPT | Mod: 59 | Performed by: FAMILY MEDICINE

## 2020-01-03 PROCEDURE — 80061 LIPID PANEL: CPT | Performed by: FAMILY MEDICINE

## 2020-01-03 PROCEDURE — 83036 HEMOGLOBIN GLYCOSYLATED A1C: CPT | Performed by: FAMILY MEDICINE

## 2020-01-03 PROCEDURE — 36415 COLL VENOUS BLD VENIPUNCTURE: CPT | Performed by: FAMILY MEDICINE

## 2020-01-03 PROCEDURE — 90686 IIV4 VACC NO PRSV 0.5 ML IM: CPT | Performed by: FAMILY MEDICINE

## 2020-01-03 PROCEDURE — 84443 ASSAY THYROID STIM HORMONE: CPT | Performed by: FAMILY MEDICINE

## 2020-01-03 PROCEDURE — 80048 BASIC METABOLIC PNL TOTAL CA: CPT | Performed by: FAMILY MEDICINE

## 2020-01-03 PROCEDURE — 90471 IMMUNIZATION ADMIN: CPT | Performed by: FAMILY MEDICINE

## 2020-01-03 PROCEDURE — 99395 PREV VISIT EST AGE 18-39: CPT | Mod: 25 | Performed by: FAMILY MEDICINE

## 2020-01-03 RX ORDER — DESOGESTREL AND ETHINYL ESTRADIOL 0.15-0.03
1 KIT ORAL DAILY
Qty: 84 TABLET | Refills: 3 | Status: SHIPPED | OUTPATIENT
Start: 2020-01-03 | End: 2021-02-25

## 2020-01-03 ASSESSMENT — PAIN SCALES - GENERAL: PAINLEVEL: NO PAIN (0)

## 2020-01-03 ASSESSMENT — MIFFLIN-ST. JEOR: SCORE: 1517.1

## 2020-01-03 NOTE — PROGRESS NOTES
SUBJECTIVE:   CC: Nancie Gonzalez is an 34 year old woman who presents for preventive health visit.     Healthy Habits:     Getting at least 3 servings of Calcium per day:  Yes    Bi-annual eye exam:  Yes    Dental care twice a year:  Yes    Sleep apnea or symptoms of sleep apnea:  None    Diet:  Vegetarian/vegan    Frequency of exercise:  4-5 days/week    Duration of exercise:  30-45 minutes    Taking medications regularly:  Yes    Medication side effects:  None    PHQ-2 Total Score: 0    Additional concerns today:  No    Flu Vaccine - offered, patient will do today.        Today's PHQ-2 Score:   PHQ-2 ( 1999 Pfizer) 12/31/2019   Q1: Little interest or pleasure in doing things 0   Q2: Feeling down, depressed or hopeless 0   PHQ-2 Score 0   Q1: Little interest or pleasure in doing things Not at all   Q2: Feeling down, depressed or hopeless Not at all   PHQ-2 Score 0       Abuse: Current or Past(Physical, Sexual or Emotional)- No  Do you feel safe in your environment? Yes        Social History     Tobacco Use     Smoking status: Never Smoker     Smokeless tobacco: Never Used   Substance Use Topics     Alcohol use: Yes     Comment: occasional/social     Alcohol Use 12/31/2019   Prescreen: >3 drinks/day or >7 drinks/week? No   Prescreen: >3 drinks/day or >7 drinks/week? -     Reviewed orders with patient.  Reviewed health maintenance and updated orders accordingly - Yes  Lab work is in process  Labs reviewed in EPIC  BP Readings from Last 3 Encounters:   01/03/20 112/80   01/04/19 126/85   01/03/18 122/80    Wt Readings from Last 3 Encounters:   01/03/20 83.4 kg (183 lb 14.4 oz)   01/04/19 89.7 kg (197 lb 12.8 oz)   01/03/18 92.5 kg (204 lb)                  Patient Active Problem List   Diagnosis     History of abnormal cervical Pap smear     History of HPV infection     CARDIOVASCULAR SCREENING; LDL GOAL LESS THAN 160     Non morbid obesity, unspecified obesity type     Dysplasia of cervix, low grade (IVETT 1)      Hypertriglyceridemia     Elevated fasting glucose     PCOS (polycystic ovarian syndrome)     Dyslipidemia     Cervical cancer screening     Obesity (BMI 30-39.9)     Past Surgical History:   Procedure Laterality Date     COLPOSCOPY CERVIX, BIOPSY CERVIX, ENDOCERVICAL CURETTAGE, COMBINED  6/24/2008 2011       Social History     Tobacco Use     Smoking status: Never Smoker     Smokeless tobacco: Never Used   Substance Use Topics     Alcohol use: Yes     Comment: occasional/social     Family History   Problem Relation Age of Onset     Hyperlipidemia Mother      Hypertension Father      Hyperlipidemia Brother          Current Outpatient Medications   Medication Sig Dispense Refill     desogestrel-ethinyl estradiol (APRI) 0.15-30 MG-MCG tablet Take 1 tablet by mouth daily 84 tablet 3     metFORMIN (GLUCOPHAGE-XR) 500 MG 24 hr tablet Take 1 tablet (500 mg) by mouth 2 times daily (with meals) 60 tablet 0     Allergies   Allergen Reactions     Amoxicillin Anaphylaxis     Penicillins Anaphylaxis     Recent Labs   Lab Test 01/03/20  0829 01/04/19  0913 01/03/18  0846   A1C 5.2 5.8* 5.6   LDL Cannot estimate LDL when triglyceride exceeds 400 mg/dL  122* 71 Cannot estimate LDL when triglyceride exceeds 400 mg/dL  78   HDL 42* 42* 34*   TRIG 403* 314* 457*   ALT  --  11  --    CR 0.74 0.70  --    GFRESTIMATED >90 >90  --    GFRESTBLACK >90 >90  --    POTASSIUM 4.3 4.3  --    TSH  --  3.67  --         Mammogram not appropriate for this patient based on age.    Pertinent mammograms are reviewed under the imaging tab.  History of abnormal Pap smear: NO - age 30-65 PAP every 5 years with negative HPV co-testing recommended  PAP / HPV Latest Ref Rng & Units 1/4/2019 12/29/2015   PAP - NIL NIL   HPV 16 DNA NEG:Negative Negative Negative   HPV 18 DNA NEG:Negative Negative Negative   OTHER HR HPV NEG:Negative Negative Negative     Reviewed and updated as needed this visit by clinical staff  Tobacco  Allergies  Meds  Med Hx   "Surg Hx  Fam Hx  Soc Hx        Reviewed and updated as needed this visit by Provider          Past Medical History:   Diagnosis Date     IVETT I (cervical intraepithelial neoplasia I)     Pt reported      Past Surgical History:   Procedure Laterality Date     COLPOSCOPY CERVIX, BIOPSY CERVIX, ENDOCERVICAL CURETTAGE, COMBINED  6/24/2008 2011     OB History   No obstetric history on file.       Review of Systems  CONSTITUTIONAL: NEGATIVE for fever, chills, change in weight  INTEGUMENTARU/SKIN: NEGATIVE for worrisome rashes, moles or lesions  EYES: NEGATIVE for vision changes or irritation  ENT: NEGATIVE for ear, mouth and throat problems  RESP: NEGATIVE for significant cough or SOB  BREAST: NEGATIVE for masses, tenderness or discharge  CV: NEGATIVE for chest pain, palpitations or peripheral edema  GI: NEGATIVE for nausea, abdominal pain, heartburn, or change in bowel habits  : NEGATIVE for unusual urinary or vaginal symptoms. Periods are regular.  MUSCULOSKELETAL: NEGATIVE for significant arthralgias or myalgia  NEURO: NEGATIVE for weakness, dizziness or paresthesias  ENDOCRINE: NEGATIVE for temperature intolerance, skin/hair changes  HEME/ALLERGY/IMMUNE: NEGATIVE for bleeding problems  PSYCHIATRIC: NEGATIVE for changes in mood or affect     OBJECTIVE:   /80 (BP Location: Right arm, Patient Position: Sitting, Cuff Size: Adult Large)   Pulse 73   Temp 98.6  F (37  C) (Oral)   Ht 1.638 m (5' 4.5\")   Wt 83.4 kg (183 lb 14.4 oz)   LMP 12/13/2019 (Approximate)   SpO2 100%   BMI 31.08 kg/m    Physical Exam  GENERAL: healthy, alert and no distress  EYES: Eyes grossly normal to inspection, PERRL and conjunctivae and sclerae normal  HENT: ear canals and TM's normal, nose and mouth without ulcers or lesions  NECK: no adenopathy, no asymmetry, masses, or scars and thyroid normal to palpation  RESP: lungs clear to auscultation - no rales, rhonchi or wheezes  BREAST: normal without masses, tenderness or nipple " discharge and no palpable axillary masses or adenopathy  CV: regular rate and rhythm, normal S1 S2, no S3 or S4, no murmur, click or rub, no peripheral edema and peripheral pulses strong  ABDOMEN: soft, nontender, no hepatosplenomegaly, no masses and bowel sounds normal   (female): normal female external genitalia, normal urethral meatus, vaginal mucosa pink, moist, well rugated, and normal cervix/adnexa/uterus without masses or discharge  MS: no gross musculoskeletal defects noted, no edema  SKIN: no suspicious lesions or rashes  NEURO: Normal strength and tone, mentation intact and speech normal  PSYCH: mentation appears normal, affect normal/bright    Diagnostic Test Results:  Labs reviewed in Epic  Results for orders placed or performed in visit on 01/03/20 (from the past 24 hour(s))   **Basic metabolic panel FUTURE anytime   Result Value Ref Range    Sodium 140 133 - 144 mmol/L    Potassium 4.3 3.4 - 5.3 mmol/L    Chloride 108 94 - 109 mmol/L    Carbon Dioxide 27 20 - 32 mmol/L    Anion Gap 5 3 - 14 mmol/L    Glucose 112 (H) 70 - 99 mg/dL    Urea Nitrogen 12 7 - 30 mg/dL    Creatinine 0.74 0.52 - 1.04 mg/dL    GFR Estimate >90 >60 mL/min/[1.73_m2]    GFR Estimate If Black >90 >60 mL/min/[1.73_m2]    Calcium 8.5 8.5 - 10.1 mg/dL   **A1C FUTURE anytime   Result Value Ref Range    Hemoglobin A1C 5.2 0 - 5.6 %   Lipid panel reflex to direct LDL Fasting   Result Value Ref Range    Cholesterol 213 (H) <200 mg/dL    Triglycerides 403 (H) <150 mg/dL    HDL Cholesterol 42 (L) >49 mg/dL    LDL Cholesterol Calculated  <100 mg/dL     Cannot estimate LDL when triglyceride exceeds 400 mg/dL    Non HDL Cholesterol 171 (H) <130 mg/dL   LDL cholesterol direct   Result Value Ref Range    LDL Cholesterol Direct 122 (H) <100 mg/dL       ASSESSMENT/PLAN:   1. Routine general medical examination at a health care facility  Discussed on regular exercises, healthy eating, self breast exams monthly and routine dental checks.    - TSH  with free T4 reflex    2. Encounter for initial prescription of contraceptive pills    - desogestrel-ethinyl estradiol (APRI) 0.15-30 MG-MCG tablet; Take 1 tablet by mouth daily  Dispense: 84 tablet; Refill: 3    3. Need for prophylactic vaccination and inoculation against influenza    - INFLUENZA VACCINE IM > 6 MONTHS VALENT IIV4 [84897]  - Vaccine Administration, Initial [91469]    4. CARDIOVASCULAR SCREENING; LDL GOAL LESS THAN 160  LDL Cholesterol Calculated   Date Value Ref Range Status   01/03/2020  <100 mg/dL Final    Cannot estimate LDL when triglyceride exceeds 400 mg/dL     LDL Cholesterol Direct   Date Value Ref Range Status   01/03/2020 122 (H) <100 mg/dL Final     Comment:     Above desirable:  100-129 mg/dl  Borderline High:  130-159 mg/dL  High:             160-189 mg/dL  Very high:       >189 mg/dl           5. Obesity (BMI 30-39.9)  Wt Readings from Last 5 Encounters:   01/03/20 83.4 kg (183 lb 14.4 oz)   01/04/19 89.7 kg (197 lb 12.8 oz)   01/03/18 92.5 kg (204 lb)   12/28/16 85.7 kg (189 lb)   12/29/15 85.8 kg (189 lb 3.2 oz)     Appreciated patient's efforts on weight loss, portion control, regular exercises, will continue those efforts    6. Hypertriglyceridemia  Triglycerides   Date Value Ref Range Status   01/03/2020 403 (H) <150 mg/dL Final     Comment:     Borderline high:  150-199 mg/dl  High:             200-499 mg/dl  Very high:       >499 mg/dl  Fasting specimen       Reviewed worsening hypertriglyceridemia  Emphasized on healthy eating, regular exercises.  Recheck thyroid labs today  Recommended to start on omega-3 flaxseed oil capsules 2 twice a day, recheck lipids in 6 months  Patient eats vegetarian diet  - Lipid panel reflex to direct LDL Fasting; Future  - TSH with free T4 reflex    7. Dyslipidemia  as above    - Lipid panel reflex to direct LDL Fasting; Future  - TSH with free T4 reflex    8. Cervical cancer screening  Patient is not due for Pap until 2024    9. PCOS (polycystic  "ovarian syndrome)  Patient was on metformin before, not interested in continuing with metformin  Continue with efforts on weight loss, healthy eating and regular exercises      COUNSELING:  Reviewed preventive health counseling, as reflected in patient instructions  Special attention given to:        Regular exercise       Healthy diet/nutrition       Vision screening       Contraception       Family planning       Folic Acid Counseling    Estimated body mass index is 31.08 kg/m  as calculated from the following:    Height as of this encounter: 1.638 m (5' 4.5\").    Weight as of this encounter: 83.4 kg (183 lb 14.4 oz).    Weight management plan: Discussed healthy diet and exercise guidelines     reports that she has never smoked. She has never used smokeless tobacco.      Counseling Resources:  ATP IV Guidelines  Pooled Cohorts Equation Calculator  Breast Cancer Risk Calculator  FRAX Risk Assessment  ICSI Preventive Guidelines  Dietary Guidelines for Americans, 2010  USDA's MyPlate  ASA Prophylaxis  Lung CA Screening    Boo Cazares MD  [facility]  Chart documentation done in part with Dragon Voice recognition Software. Although reviewed after completion, some word and grammatical error may remain.    "

## 2020-01-03 NOTE — PATIENT INSTRUCTIONS
Get the flu shot today  Start on omega flax seed oil capsules twice daily  Schedule for fasting labs in 3 months          Preventive Health Recommendations  Female Ages 26 - 39  Yearly exam:   See your health care provider every year in order to    Review health changes.     Discuss preventive care.      Review your medicines if you your doctor has prescribed any.    Until age 30: Get a Pap test every three years (more often if you have had an abnormal result).    After age 30: Talk to your doctor about whether you should have a Pap test every 3 years or have a Pap test with HPV screening every 5 years.   You do not need a Pap test if your uterus was removed (hysterectomy) and you have not had cancer.  You should be tested each year for STDs (sexually transmitted diseases), if you're at risk.   Talk to your provider about how often to have your cholesterol checked.  If you are at risk for diabetes, you should have a diabetes test (fasting glucose).  Shots: Get a flu shot each year. Get a tetanus shot every 10 years.   Nutrition:     Eat at least 5 servings of fruits and vegetables each day.    Eat whole-grain bread, whole-wheat pasta and brown rice instead of white grains and rice.    Get adequate Calcium and Vitamin D.     Lifestyle    Exercise at least 150 minutes a week (30 minutes a day, 5 days of the week). This will help you control your weight and prevent disease.    Limit alcohol to one drink per day.    No smoking.     Wear sunscreen to prevent skin cancer.    See your dentist every six months for an exam and cleaning.

## 2020-02-11 DIAGNOSIS — Z30.011 ENCOUNTER FOR INITIAL PRESCRIPTION OF CONTRACEPTIVE PILLS: ICD-10-CM

## 2020-02-11 RX ORDER — DESOGESTREL AND ETHINYL ESTRADIOL 0.15-0.03
KIT ORAL
Qty: 84 TABLET | Refills: 3 | OUTPATIENT
Start: 2020-02-11

## 2020-02-11 NOTE — TELEPHONE ENCOUNTER
Per chart review, this medication was filled last month. Refills for one year given. Pharmacy notified.     Casandra Denny RN, BSN, PHN  M.[facility]

## 2020-11-29 ENCOUNTER — HEALTH MAINTENANCE LETTER (OUTPATIENT)
Age: 35
End: 2020-11-29

## 2021-02-14 ENCOUNTER — HEALTH MAINTENANCE LETTER (OUTPATIENT)
Age: 36
End: 2021-02-14

## 2021-03-22 ENCOUNTER — TELEPHONE (OUTPATIENT)
Dept: FAMILY MEDICINE | Facility: CLINIC | Age: 36
End: 2021-03-22

## 2021-03-28 NOTE — PROGRESS NOTES
SUBJECTIVE:   CC: Nancie Gonzalez is an 35 year old woman who presents for preventive health visit.       Patient has been advised of split billing requirements and indicates understanding: Yes  Healthy Habits:    Do you get at least three servings of calcium containing foods daily (dairy, green leafy vegetables, etc.)? yes    Amount of exercise or daily activities, outside of work: 5-7 day(s) per week    Problems taking medications regularly No    Medication side effects: No    Have you had an eye exam in the past two years? yes    Do you see a dentist twice per year? yes    Do you have sleep apnea, excessive snoring or daytime drowsiness?no      Today's PHQ-2 Score:   PHQ-2 ( 1999 Pfizer) 3/29/2021 12/31/2019   Q1: Little interest or pleasure in doing things 0 0   Q2: Feeling down, depressed or hopeless 0 0   PHQ-2 Score 0 0   Q1: Little interest or pleasure in doing things - Not at all   Q2: Feeling down, depressed or hopeless - Not at all   PHQ-2 Score - 0       Abuse: Current or Past(Physical, Sexual or Emotional)- No  Do you feel safe in your environment? Yes    Have you ever done Advance Care Planning? (For example, a Health Directive, POLST, or a discussion with a medical provider or your loved ones about your wishes): No, advance care planning information given to patient to review.  Patient plans to discuss their wishes with loved ones or provider.      Social History     Tobacco Use     Smoking status: Never Smoker     Smokeless tobacco: Never Used   Substance Use Topics     Alcohol use: Yes     Comment: occasional/social     If you drink alcohol do you typically have >3 drinks per day or >7 drinks per week? No                     Reviewed orders with patient.  Reviewed health maintenance and updated orders accordingly - Yes  Labs reviewed in EPIC  BP Readings from Last 3 Encounters:   03/29/21 129/88   01/03/20 112/80   01/04/19 126/85    Wt Readings from Last 3 Encounters:   03/29/21 91.1 kg (200 lb  14.4 oz)   01/03/20 83.4 kg (183 lb 14.4 oz)   01/04/19 89.7 kg (197 lb 12.8 oz)                  Patient Active Problem List   Diagnosis     History of abnormal cervical Pap smear     History of HPV infection     CARDIOVASCULAR SCREENING; LDL GOAL LESS THAN 160     Non morbid obesity, unspecified obesity type     Dysplasia of cervix, low grade (IVETT 1)     Hypertriglyceridemia     Elevated fasting glucose     PCOS (polycystic ovarian syndrome)     Dyslipidemia     Cervical cancer screening     Obesity (BMI 30-39.9)     Past Surgical History:   Procedure Laterality Date     COLPOSCOPY CERVIX, BIOPSY CERVIX, ENDOCERVICAL CURETTAGE, COMBINED  6/24/2008 2011       Social History     Tobacco Use     Smoking status: Never Smoker     Smokeless tobacco: Never Used   Substance Use Topics     Alcohol use: Yes     Comment: occasional/social     Family History   Problem Relation Age of Onset     Hyperlipidemia Mother      Hypertension Father      Hyperlipidemia Brother      Diabetes Maternal Grandmother      Ovarian Cancer Maternal Grandmother      Coronary Artery Disease Maternal Grandfather      Cerebrovascular Disease No family hx of      Thyroid Disease No family hx of      Breast Cancer No family hx of      Uterine Cancer No family hx of      Prostate Cancer No family hx of      Colorectal Cancer No family hx of      Pancreatic Cancer No family hx of      Lung Cancer No family hx of      Melanoma No family hx of      Autoimmune Disease No family hx of          Current Outpatient Medications   Medication Sig Dispense Refill     ISIBLOOM 0.15-30 MG-MCG tablet TAKE 1 TABLET BY MOUTH DAILY 84 tablet 0     Allergies   Allergen Reactions     Amoxicillin Anaphylaxis     Penicillins Anaphylaxis         Patient under 40 years of age: Routine Mammogram Screening not recommended.   Pertinent mammograms are reviewed under the imaging tab.    Pertinent mammograms are reviewed under the imaging tab.  History of abnormal Pap smear: NO  "- age 30-65 PAP every 5 years with negative HPV co-testing recommended  PAP / HPV Latest Ref Rng & Units 1/4/2019 12/29/2015   PAP - NIL NIL   HPV 16 DNA NEG:Negative Negative Negative   HPV 18 DNA NEG:Negative Negative Negative   OTHER HR HPV NEG:Negative Negative Negative     Reviewed and updated as needed this visit by clinical staff                 Reviewed and updated as needed this visit by Provider                Past Medical History:   Diagnosis Date     IVETT I (cervical intraepithelial neoplasia I)     Pt reported      Past Surgical History:   Procedure Laterality Date     COLPOSCOPY CERVIX, BIOPSY CERVIX, ENDOCERVICAL CURETTAGE, COMBINED  6/24/2008 2011       ROS:  CONSTITUTIONAL:NEGATIVE for fever, chills, change in weight  INTEGUMENTARY/SKIN: NEGATIVE for worrisome rashes, moles or lesions  EYES: NEGATIVE for vision changes or irritation  ENT: NEGATIVE for ear, mouth and throat problems  RESP:NEGATIVE for significant cough or SOB  BREAST: NEGATIVE for masses, tenderness or discharge  CV: NEGATIVE for chest pain, palpitations or peripheral edema  GI: NEGATIVE for nausea, abdominal pain, heartburn, or change in bowel habits   female: normal menses, no unusual urinary symptoms and no unusual vaginal symptoms  MUSCULOSKELETAL:NEGATIVE for significant arthralgias or myalgia  NEURO: NEGATIVE for weakness, dizziness or paresthesias  ENDOCRINE: NEGATIVE for temperature intolerance, skin/hair changes  HEME/ALLERGY/IMMUNE: NEGATIVE for bleeding problems  PSYCHIATRIC: NEGATIVE for changes in mood or affect     OBJECTIVE:   /88 (BP Location: Right arm, Patient Position: Sitting, Cuff Size: Adult Large)   Pulse 94   Temp 98.6  F (37  C) (Oral)   Resp 18   Ht 1.638 m (5' 4.5\")   Wt 91.1 kg (200 lb 14.4 oz)   LMP 03/15/2021   SpO2 97%   BMI 33.95 kg/m     Wt Readings from Last 4 Encounters:   03/29/21 91.1 kg (200 lb 14.4 oz)   01/03/20 83.4 kg (183 lb 14.4 oz)   01/04/19 89.7 kg (197 lb 12.8 oz) "   01/03/18 92.5 kg (204 lb)       EXAM:  GENERAL: alert, no distress and obese  EYES: Eyes grossly normal to inspection and conjunctivae and sclerae normal  HENT: ear canals and TM's normal, nose and mouth without ulcers or lesions  NECK: no adenopathy, no asymmetry, masses, or scars and thyroid enlarged to palpation  RESP: lungs clear to auscultation - no rales, rhonchi or wheezes  BREAST: normal without masses, tenderness or nipple discharge and no palpable axillary masses or adenopathy  CV: regular rates and rhythm, no murmur, click or rub, peripheral pulses strong and no peripheral edema  ABDOMEN: soft, nontender, no hepatosplenomegaly, no masses and bowel sounds normal   (female): normal female external genitalia, normal urethral meatus, vaginal mucosa pink, moist, well rugated, and normal cervix/adnexa/uterus without masses or discharge  MS: no gross musculoskeletal defects noted, no edema  SKIN: no suspicious lesions or rashes  NEURO: Normal strength and tone, mentation intact and speech normal  PSYCH: mentation appears normal, affect normal/bright  LYMPH: no cervical, supraclavicular, axillary, or inguinal adenopathy    Diagnostic Test Results:  Labs reviewed in Epic  Results for orders placed or performed in visit on 03/29/21   Hepatitis C Screen Reflex to HCV RNA Quant and Genotype     Status: None   Result Value Ref Range    Hepatitis C Antibody Nonreactive NR^Nonreactive   Lipid panel reflex to direct LDL Fasting     Status: Abnormal   Result Value Ref Range    Cholesterol 170 <200 mg/dL    Triglycerides 464 (H) <150 mg/dL    HDL Cholesterol 32 (L) >49 mg/dL    LDL Cholesterol Calculated  <100 mg/dL     Cannot estimate LDL when triglyceride exceeds 400 mg/dL    Non HDL Cholesterol 138 (H) <130 mg/dL   Comprehensive metabolic panel     Status: Abnormal   Result Value Ref Range    Sodium 139 133 - 144 mmol/L    Potassium 4.2 3.4 - 5.3 mmol/L    Chloride 109 94 - 109 mmol/L    Carbon Dioxide 25 20 - 32  mmol/L    Anion Gap 5 3 - 14 mmol/L    Glucose 116 (H) 70 - 99 mg/dL    Urea Nitrogen 9 7 - 30 mg/dL    Creatinine 0.83 0.52 - 1.04 mg/dL    GFR Estimate 90 >60 mL/min/[1.73_m2]    GFR Estimate If Black >90 >60 mL/min/[1.73_m2]    Calcium 8.9 8.5 - 10.1 mg/dL    Bilirubin Total 0.2 0.2 - 1.3 mg/dL    Albumin 3.6 3.4 - 5.0 g/dL    Protein Total 7.6 6.8 - 8.8 g/dL    Alkaline Phosphatase 61 40 - 150 U/L    ALT 17 0 - 50 U/L    AST 19 0 - 45 U/L   TSH with free T4 reflex     Status: None   Result Value Ref Range    TSH 3.28 0.40 - 4.00 mU/L   LDL cholesterol direct     Status: None   Result Value Ref Range    LDL Cholesterol Direct 72 <100 mg/dL       ASSESSMENT/PLAN:   Nancie was seen today for physical.    Diagnoses and all orders for this visit:    Routine general medical examination at a health care facility  -     LDL cholesterol direct    Need for hepatitis C screening test  -     Hepatitis C Screen Reflex to HCV RNA Quant and Genotype    CARDIOVASCULAR SCREENING; LDL GOAL LESS THAN 160  -     Lipid panel reflex to direct LDL Fasting    Screening for diabetes mellitus (DM)  -     JUST IN CASE  -     Comprehensive metabolic panel    Screening for thyroid disorder  -     TSH with free T4 reflex    Thyroid enlarged  -     US Thyroid; Future    PLAN:   Patient needs to follow up in if no improvement,or sooner if worsening of symptoms or other symptoms develop.  FURTHER TESTING:       - thyroid ultrasound  I will place order. Please call [phone] to schedule.  Work on weight loss  Regular exercise  Will follow up and/or notify patient of  results via My Chart to determine further need for followup  Follow up office visit in one year for annual health maintenance exam, sooner PRN.      Patient has been advised of split billing requirements and indicates understanding: Yes  COUNSELING:   Reviewed preventive health counseling, as reflected in patient instructions  Special attention given to:        Regular exercise    "    Healthy diet/nutrition       Vision screening       Contraception       Family planning    Estimated body mass index is 31.08 kg/m  as calculated from the following:    Height as of 1/3/20: 1.638 m (5' 4.5\").    Weight as of 1/3/20: 83.4 kg (183 lb 14.4 oz).    Weight management plan: Discussed healthy diet and exercise guidelines    She reports that she has never smoked. She has never used smokeless tobacco.      Counseling Resources:  ATP IV Guidelines  Pooled Cohorts Equation Calculator  Breast Cancer Risk Calculator  BRCA-Related Cancer Risk Assessment: FHS-7 Tool  FRAX Risk Assessment  ICSI Preventive Guidelines  Dietary Guidelines for Americans, 2010  USDA's MyPlate  ASA Prophylaxis  Lung CA Screening    EMANI Olivas CNP  [facility]  "

## 2021-03-28 NOTE — PATIENT INSTRUCTIONS
PLAN:   1.   Symptomatic therapy suggested: Increase calcium to 1000mg and 1000iu Vit D  2.  Orders Placed This Encounter   Procedures     US Thyroid     Hepatitis C Screen Reflex to HCV RNA Quant and Genotype     Lipid panel reflex to direct LDL Fasting     JUST IN CASE     Comprehensive metabolic panel     TSH with free T4 reflex       3. Patient needs to follow up in if no improvement,or sooner if worsening of symptoms or other symptoms develop.  FURTHER TESTING:       - thyroid ultrasound  I will place order. Please call [phone] to schedule.  Work on weight loss  Regular exercise  Will follow up and/or notify patient of  results via My Chart to determine further need for followup  Follow up office visit in one year for annual health maintenance exam, sooner PRN.    Preventive Health Recommendations  Female Ages 26 - 39  Yearly exam:   See your health care provider every year in order to    Review health changes.     Discuss preventive care.      Review your medicines if you your doctor has prescribed any.    Until age 30: Get a Pap test every three years (more often if you have had an abnormal result).    After age 30: Talk to your doctor about whether you should have a Pap test every 3 years or have a Pap test with HPV screening every 5 years.   You do not need a Pap test if your uterus was removed (hysterectomy) and you have not had cancer.  You should be tested each year for STDs (sexually transmitted diseases), if you're at risk.   Talk to your provider about how often to have your cholesterol checked.  If you are at risk for diabetes, you should have a diabetes test (fasting glucose).  Shots: Get a flu shot each year. Get a tetanus shot every 10 years.   Nutrition:     Eat at least 5 servings of fruits and vegetables each day.    Eat whole-grain bread, whole-wheat pasta and brown rice instead of white grains and rice.    Get adequate Calcium and Vitamin D.     Lifestyle    Exercise at least 150 minutes  a week (30 minutes a day, 5 days of the week). This will help you control your weight and prevent disease.    Limit alcohol to one drink per day.    No smoking.     Wear sunscreen to prevent skin cancer.    See your dentist every six months for an exam and cleaning.

## 2021-03-29 ENCOUNTER — OFFICE VISIT (OUTPATIENT)
Dept: FAMILY MEDICINE | Facility: CLINIC | Age: 36
End: 2021-03-29
Payer: COMMERCIAL

## 2021-03-29 VITALS
HEIGHT: 65 IN | SYSTOLIC BLOOD PRESSURE: 129 MMHG | BODY MASS INDEX: 33.47 KG/M2 | WEIGHT: 200.9 LBS | HEART RATE: 94 BPM | RESPIRATION RATE: 18 BRPM | OXYGEN SATURATION: 97 % | TEMPERATURE: 98.6 F | DIASTOLIC BLOOD PRESSURE: 88 MMHG

## 2021-03-29 DIAGNOSIS — E04.9 THYROID ENLARGED: ICD-10-CM

## 2021-03-29 DIAGNOSIS — Z11.59 NEED FOR HEPATITIS C SCREENING TEST: ICD-10-CM

## 2021-03-29 DIAGNOSIS — Z00.00 ROUTINE GENERAL MEDICAL EXAMINATION AT A HEALTH CARE FACILITY: Primary | ICD-10-CM

## 2021-03-29 DIAGNOSIS — Z13.1 SCREENING FOR DIABETES MELLITUS (DM): ICD-10-CM

## 2021-03-29 DIAGNOSIS — Z13.29 SCREENING FOR THYROID DISORDER: ICD-10-CM

## 2021-03-29 DIAGNOSIS — Z13.6 CARDIOVASCULAR SCREENING; LDL GOAL LESS THAN 160: ICD-10-CM

## 2021-03-29 LAB
ALBUMIN SERPL-MCNC: 3.6 G/DL (ref 3.4–5)
ALP SERPL-CCNC: 61 U/L (ref 40–150)
ALT SERPL W P-5'-P-CCNC: 17 U/L (ref 0–50)
ANION GAP SERPL CALCULATED.3IONS-SCNC: 5 MMOL/L (ref 3–14)
AST SERPL W P-5'-P-CCNC: 19 U/L (ref 0–45)
BILIRUB SERPL-MCNC: 0.2 MG/DL (ref 0.2–1.3)
BUN SERPL-MCNC: 9 MG/DL (ref 7–30)
CALCIUM SERPL-MCNC: 8.9 MG/DL (ref 8.5–10.1)
CHLORIDE SERPL-SCNC: 109 MMOL/L (ref 94–109)
CHOLEST SERPL-MCNC: 170 MG/DL
CO2 SERPL-SCNC: 25 MMOL/L (ref 20–32)
CREAT SERPL-MCNC: 0.83 MG/DL (ref 0.52–1.04)
GFR SERPL CREATININE-BSD FRML MDRD: 90 ML/MIN/{1.73_M2}
GLUCOSE SERPL-MCNC: 116 MG/DL (ref 70–99)
HCV AB SERPL QL IA: NONREACTIVE
HDLC SERPL-MCNC: 32 MG/DL
LDLC SERPL CALC-MCNC: ABNORMAL MG/DL
LDLC SERPL DIRECT ASSAY-MCNC: 72 MG/DL
NONHDLC SERPL-MCNC: 138 MG/DL
POTASSIUM SERPL-SCNC: 4.2 MMOL/L (ref 3.4–5.3)
PROT SERPL-MCNC: 7.6 G/DL (ref 6.8–8.8)
SODIUM SERPL-SCNC: 139 MMOL/L (ref 133–144)
TRIGL SERPL-MCNC: 464 MG/DL
TSH SERPL DL<=0.005 MIU/L-ACNC: 3.28 MU/L (ref 0.4–4)

## 2021-03-29 PROCEDURE — 36415 COLL VENOUS BLD VENIPUNCTURE: CPT | Performed by: NURSE PRACTITIONER

## 2021-03-29 PROCEDURE — 83721 ASSAY OF BLOOD LIPOPROTEIN: CPT | Performed by: NURSE PRACTITIONER

## 2021-03-29 PROCEDURE — 84443 ASSAY THYROID STIM HORMONE: CPT | Performed by: NURSE PRACTITIONER

## 2021-03-29 PROCEDURE — 80061 LIPID PANEL: CPT | Performed by: NURSE PRACTITIONER

## 2021-03-29 PROCEDURE — 86803 HEPATITIS C AB TEST: CPT | Performed by: NURSE PRACTITIONER

## 2021-03-29 PROCEDURE — 99395 PREV VISIT EST AGE 18-39: CPT | Performed by: NURSE PRACTITIONER

## 2021-03-29 PROCEDURE — 80053 COMPREHEN METABOLIC PANEL: CPT | Performed by: NURSE PRACTITIONER

## 2021-03-29 ASSESSMENT — MIFFLIN-ST. JEOR: SCORE: 1589.22

## 2021-03-29 ASSESSMENT — PAIN SCALES - GENERAL: PAINLEVEL: NO PAIN (0)

## 2021-03-31 NOTE — RESULT ENCOUNTER NOTE
Jayshree Gonzalez,    Attached are your test results.  -Triglycerides are elevated which can increase your heart disease risk.  A diet high in fat and simple carbohydrates, genetics and being overweight can contribute to this.  Your LDL(bad) cholesterol and HDL(good) cholesterol levels are normal.  ADVISE: exercising 150 minutes of aerobic exercise per week (30 minutes 5 days per week or 50 minutes 3 days per week are options), weight control, and omega-3 fatty acids (fish oil) daily are helpful to improve this.  In 12 months, you should recheck your fasting cholesterol panel by scheduling a lab-only appointment.  -Liver and gallbladder tests (ALT,AST, Alk phos,bilirubin) are normal.  -Kidney function (GFR) is normal.  -Sodium is normal.  -Potassium is normal.  -Calcium is normal.  -Glucose is slight elevated and may be a sign of early diabetes (prediabetes). ADVISE:: eating a low carbohydrate diet, exercising, trying to lose weight (if necessary) and rechecking your glucose level in 12 months.  -TSH (thyroid stimulating hormone) level is normal which indicates normal thyroid function.  -Hepatitis C antibody screen test shows no signs of a previous hepatitis C infection.   Please contact us if you have any questions.    Balbina Armstrong, CNP

## 2021-05-23 DIAGNOSIS — Z30.011 ENCOUNTER FOR INITIAL PRESCRIPTION OF CONTRACEPTIVE PILLS: ICD-10-CM

## 2021-05-24 RX ORDER — DESOGESTREL AND ETHINYL ESTRADIOL 0.15-0.03
KIT ORAL
Qty: 84 TABLET | Refills: 0 | Status: SHIPPED | OUTPATIENT
Start: 2021-05-24 | End: 2021-08-23

## 2021-06-15 ENCOUNTER — ANCILLARY PROCEDURE (OUTPATIENT)
Dept: ULTRASOUND IMAGING | Facility: CLINIC | Age: 36
End: 2021-06-15
Attending: NURSE PRACTITIONER
Payer: COMMERCIAL

## 2021-06-15 DIAGNOSIS — E04.9 THYROID ENLARGED: ICD-10-CM

## 2021-06-15 DIAGNOSIS — E04.1 THYROID NODULE: Primary | ICD-10-CM

## 2021-06-15 PROCEDURE — 76536 US EXAM OF HEAD AND NECK: CPT | Performed by: RADIOLOGY

## 2021-06-15 NOTE — RESULT ENCOUNTER NOTE
Jayshree Gonzalez,    Attached are your test results.  Ultrasound shows 2 small nodules. Recommendation is to repeat ultrasound in 1 year    Please contact us if you have any questions.    Balbina Armstrong, CNP

## 2021-08-21 DIAGNOSIS — Z30.011 ENCOUNTER FOR INITIAL PRESCRIPTION OF CONTRACEPTIVE PILLS: ICD-10-CM

## 2021-08-23 RX ORDER — DESOGESTREL AND ETHINYL ESTRADIOL 0.15-0.03
KIT ORAL
Qty: 84 TABLET | Refills: 0 | Status: SHIPPED | OUTPATIENT
Start: 2021-08-23 | End: 2021-11-19

## 2021-09-19 ENCOUNTER — HEALTH MAINTENANCE LETTER (OUTPATIENT)
Age: 36
End: 2021-09-19

## 2021-11-19 DIAGNOSIS — Z30.011 ENCOUNTER FOR INITIAL PRESCRIPTION OF CONTRACEPTIVE PILLS: ICD-10-CM

## 2021-11-19 RX ORDER — DESOGESTREL AND ETHINYL ESTRADIOL 0.15-0.03
KIT ORAL
Qty: 84 TABLET | Refills: 0 | Status: SHIPPED | OUTPATIENT
Start: 2021-11-19 | End: 2022-04-25

## 2021-11-19 NOTE — TELEPHONE ENCOUNTER
Prescription approved per [facility] Refill Protocol.  Angelia Long RN, BSN   [facility]

## 2022-04-24 ASSESSMENT — ENCOUNTER SYMPTOMS
CHILLS: 0
CONSTIPATION: 0
ARTHRALGIAS: 0
ABDOMINAL PAIN: 0
JOINT SWELLING: 0
NAUSEA: 0
FREQUENCY: 0
NERVOUS/ANXIOUS: 0
SORE THROAT: 0
HEMATURIA: 0
WEAKNESS: 0
DYSURIA: 0
SHORTNESS OF BREATH: 0
FEVER: 0
COUGH: 0
PARESTHESIAS: 0
HEMATOCHEZIA: 0
HEADACHES: 0
EYE PAIN: 0
HEARTBURN: 0
BREAST MASS: 0
MYALGIAS: 0
DIZZINESS: 0
PALPITATIONS: 0
DIARRHEA: 0

## 2022-04-25 ENCOUNTER — OFFICE VISIT (OUTPATIENT)
Dept: FAMILY MEDICINE | Facility: CLINIC | Age: 37
End: 2022-04-25
Payer: COMMERCIAL

## 2022-04-25 VITALS
TEMPERATURE: 98.1 F | BODY MASS INDEX: 33.26 KG/M2 | HEART RATE: 92 BPM | RESPIRATION RATE: 14 BRPM | WEIGHT: 199.6 LBS | OXYGEN SATURATION: 99 % | SYSTOLIC BLOOD PRESSURE: 126 MMHG | HEIGHT: 65 IN | DIASTOLIC BLOOD PRESSURE: 84 MMHG

## 2022-04-25 DIAGNOSIS — E28.2 PCOS (POLYCYSTIC OVARIAN SYNDROME): ICD-10-CM

## 2022-04-25 DIAGNOSIS — Z13.1 SCREENING FOR DIABETES MELLITUS (DM): ICD-10-CM

## 2022-04-25 DIAGNOSIS — Z00.00 ROUTINE GENERAL MEDICAL EXAMINATION AT A HEALTH CARE FACILITY: Primary | ICD-10-CM

## 2022-04-25 DIAGNOSIS — Z30.011 ENCOUNTER FOR INITIAL PRESCRIPTION OF CONTRACEPTIVE PILLS: ICD-10-CM

## 2022-04-25 DIAGNOSIS — Z13.6 CARDIOVASCULAR SCREENING; LDL GOAL LESS THAN 160: ICD-10-CM

## 2022-04-25 DIAGNOSIS — E04.1 THYROID NODULE: ICD-10-CM

## 2022-04-25 DIAGNOSIS — Z13.29 SCREENING FOR THYROID DISORDER: ICD-10-CM

## 2022-04-25 LAB
ALBUMIN SERPL-MCNC: 3.6 G/DL (ref 3.4–5)
ALP SERPL-CCNC: 70 U/L (ref 40–150)
ALT SERPL W P-5'-P-CCNC: 14 U/L (ref 0–50)
ANION GAP SERPL CALCULATED.3IONS-SCNC: 9 MMOL/L (ref 3–14)
AST SERPL W P-5'-P-CCNC: 15 U/L (ref 0–45)
BILIRUB SERPL-MCNC: 0.2 MG/DL (ref 0.2–1.3)
BUN SERPL-MCNC: 13 MG/DL (ref 7–30)
CALCIUM SERPL-MCNC: 8.8 MG/DL (ref 8.5–10.1)
CHLORIDE BLD-SCNC: 106 MMOL/L (ref 94–109)
CHOLEST SERPL-MCNC: 167 MG/DL
CO2 SERPL-SCNC: 22 MMOL/L (ref 20–32)
CREAT SERPL-MCNC: 0.76 MG/DL (ref 0.52–1.04)
FASTING STATUS PATIENT QL REPORTED: YES
GFR SERPL CREATININE-BSD FRML MDRD: >90 ML/MIN/1.73M2
GLUCOSE BLD-MCNC: 157 MG/DL (ref 70–99)
HBA1C MFR BLD: 6.3 % (ref 0–5.6)
HDLC SERPL-MCNC: 30 MG/DL
LDLC SERPL CALC-MCNC: 68 MG/DL
LDLC SERPL CALC-MCNC: ABNORMAL MG/DL
NONHDLC SERPL-MCNC: 137 MG/DL
POTASSIUM BLD-SCNC: 4.2 MMOL/L (ref 3.4–5.3)
PROT SERPL-MCNC: 7.5 G/DL (ref 6.8–8.8)
SODIUM SERPL-SCNC: 137 MMOL/L (ref 133–144)
TRIGL SERPL-MCNC: 403 MG/DL
TSH SERPL DL<=0.005 MIU/L-ACNC: 3.88 MU/L (ref 0.4–4)

## 2022-04-25 PROCEDURE — 99395 PREV VISIT EST AGE 18-39: CPT | Performed by: NURSE PRACTITIONER

## 2022-04-25 PROCEDURE — 80053 COMPREHEN METABOLIC PANEL: CPT | Performed by: NURSE PRACTITIONER

## 2022-04-25 PROCEDURE — 80061 LIPID PANEL: CPT | Performed by: NURSE PRACTITIONER

## 2022-04-25 PROCEDURE — 36415 COLL VENOUS BLD VENIPUNCTURE: CPT | Performed by: NURSE PRACTITIONER

## 2022-04-25 PROCEDURE — 83036 HEMOGLOBIN GLYCOSYLATED A1C: CPT | Performed by: NURSE PRACTITIONER

## 2022-04-25 PROCEDURE — 84443 ASSAY THYROID STIM HORMONE: CPT | Performed by: NURSE PRACTITIONER

## 2022-04-25 PROCEDURE — 83721 ASSAY OF BLOOD LIPOPROTEIN: CPT | Mod: 59 | Performed by: NURSE PRACTITIONER

## 2022-04-25 RX ORDER — DESOGESTREL AND ETHINYL ESTRADIOL 0.15-0.03
1 KIT ORAL DAILY
Qty: 84 TABLET | Refills: 4 | Status: SHIPPED | OUTPATIENT
Start: 2022-04-25 | End: 2023-03-30

## 2022-04-25 ASSESSMENT — ENCOUNTER SYMPTOMS
SHORTNESS OF BREATH: 0
FREQUENCY: 0
CHILLS: 0
PALPITATIONS: 0
PARESTHESIAS: 0
HEMATURIA: 0
FEVER: 0
JOINT SWELLING: 0
HEARTBURN: 0
DYSURIA: 0
BREAST MASS: 0
EYE PAIN: 0
HEMATOCHEZIA: 0
MYALGIAS: 0
ARTHRALGIAS: 0
HEADACHES: 0
DIARRHEA: 0
CONSTIPATION: 0
NERVOUS/ANXIOUS: 0
ABDOMINAL PAIN: 0
SORE THROAT: 0
NAUSEA: 0
WEAKNESS: 0
DIZZINESS: 0
COUGH: 0

## 2022-04-25 ASSESSMENT — PAIN SCALES - GENERAL: PAINLEVEL: NO PAIN (0)

## 2022-04-25 NOTE — PATIENT INSTRUCTIONS
PLAN:   1.   Symptomatic therapy suggested: Increase calcium to 1000mg and 800iu Vit D  2.  Orders Placed This Encounter   Medications    desogestrel-ethinyl estradiol (ISIBLOOM) 0.15-30 MG-MCG tablet     Sig: Take 1 tablet by mouth daily     Dispense:  84 tablet     Refill:  4     Orders Placed This Encounter   Procedures    REVIEW OF HEALTH MAINTENANCE PROTOCOL ORDERS    US Thyroid    Lipid panel reflex to direct LDL Fasting    Comprehensive metabolic panel    Hemoglobin A1c    TSH with free T4 reflex     3. Patient needs to follow up in if no improvement,or sooner if worsening of symptoms or other symptoms develop.  Schedule thyroid ultrasound   I will place order. Please call [phone] to schedule.  Will follow up and/or notify patient of  results via My Chart to determine further need for followup    Follow up office visit in one year for annual health maintenance exam, sooner PRN.    Preventive Health Recommendations  Female Ages 26 - 39  Yearly exam:   See your health care provider every year in order to  Review health changes.   Discuss preventive care.    Review your medicines if you your doctor has prescribed any.    Until age 30: Get a Pap test every three years (more often if you have had an abnormal result).    After age 30: Talk to your doctor about whether you should have a Pap test every 3 years or have a Pap test with HPV screening every 5 years.   You do not need a Pap test if your uterus was removed (hysterectomy) and you have not had cancer.  You should be tested each year for STDs (sexually transmitted diseases), if you're at risk.   Talk to your provider about how often to have your cholesterol checked.  If you are at risk for diabetes, you should have a diabetes test (fasting glucose).  Shots: Get a flu shot each year. Get a tetanus shot every 10 years.   Nutrition:   Eat at least 5 servings of fruits and vegetables each day.  Eat whole-grain bread, whole-wheat pasta and brown rice instead  of white grains and rice.  Get adequate Calcium and Vitamin D.     Lifestyle  Exercise at least 150 minutes a week (30 minutes a day, 5 days of the week). This will help you control your weight and prevent disease.  Limit alcohol to one drink per day.  No smoking.   Wear sunscreen to prevent skin cancer.  See your dentist every six months for an exam and cleaning.

## 2022-04-25 NOTE — PROGRESS NOTES
SUBJECTIVE:   CC: Nancie Gonzalez is an 36 year old woman who presents for preventive health visit.       Patient has been advised of split billing requirements and indicates understanding: Yes  Healthy Habits:     Getting at least 3 servings of Calcium per day:  Yes    Bi-annual eye exam:  Yes    Dental care twice a year:  Yes    Sleep apnea or symptoms of sleep apnea:  None    Diet:  Vegetarian/vegan    Frequency of exercise:  2-3 days/week    Duration of exercise:  15-30 minutes    Taking medications regularly:  Yes    Medication side effects:  Not applicable    PHQ-2 Total Score: 0    Additional concerns today:  No      Today's PHQ-2 Score:   PHQ-2 ( 1999 [employer]) 4/24/2022   Q1: Little interest or pleasure in doing things 0   Q2: Feeling down, depressed or hopeless 0   PHQ-2 Score 0   PHQ-2 Total Score (12-17 Years)- Positive if 3 or more points; Administer PHQ-A if positive -   Q1: Little interest or pleasure in doing things Not at all   Q2: Feeling down, depressed or hopeless Not at all   PHQ-2 Score 0       Abuse: Current or Past (Physical, Sexual or Emotional) - No  Do you feel safe in your environment? Yes        Social History     Tobacco Use     Smoking status: Never Smoker     Smokeless tobacco: Never Used   Substance Use Topics     Alcohol use: Yes     Comment: Rarely         Alcohol Use 4/24/2022   Prescreen: >3 drinks/day or >7 drinks/week? No   Prescreen: >3 drinks/day or >7 drinks/week? -       Reviewed orders with patient.  Reviewed health maintenance and updated orders accordingly - Yes  Labs reviewed in EPIC  BP Readings from Last 3 Encounters:   04/25/22 126/84   03/29/21 129/88   01/03/20 112/80    Wt Readings from Last 3 Encounters:   04/25/22 90.5 kg (199 lb 9.6 oz)   03/29/21 91.1 kg (200 lb 14.4 oz)   01/03/20 83.4 kg (183 lb 14.4 oz)                  Patient Active Problem List   Diagnosis     History of abnormal cervical Pap smear     History of HPV infection     CARDIOVASCULAR  SCREENING; LDL GOAL LESS THAN 160     Non morbid obesity, unspecified obesity type     Dysplasia of cervix, low grade (IVETT 1)     Hypertriglyceridemia     Elevated fasting glucose     PCOS (polycystic ovarian syndrome)     Dyslipidemia     Cervical cancer screening     Obesity (BMI 30-39.9)     Past Surgical History:   Procedure Laterality Date     BIOPSY       COLPOSCOPY CERVIX, BIOPSY CERVIX, ENDOCERVICAL CURETTAGE, COMBINED  06/24/2008 2011       Social History     Tobacco Use     Smoking status: Never Smoker     Smokeless tobacco: Never Used   Substance Use Topics     Alcohol use: Yes     Comment: Rarely     Family History   Problem Relation Age of Onset     Hyperlipidemia Mother      Hypertension Mother      Hypertension Father      Hyperlipidemia Brother      Diabetes Maternal Grandmother      Ovarian Cancer Maternal Grandmother      Other Cancer Maternal Grandmother         Overian     Coronary Artery Disease Maternal Grandfather      Hyperlipidemia Brother      Cerebrovascular Disease No family hx of      Thyroid Disease No family hx of      Breast Cancer No family hx of      Uterine Cancer No family hx of      Prostate Cancer No family hx of      Colorectal Cancer No family hx of      Pancreatic Cancer No family hx of      Lung Cancer No family hx of      Melanoma No family hx of      Autoimmune Disease No family hx of          Current Outpatient Medications   Medication Sig Dispense Refill     desogestrel-ethinyl estradiol (ISIBLOOM) 0.15-30 MG-MCG tablet Take 1 tablet by mouth daily 84 tablet 4     Allergies   Allergen Reactions     Amoxicillin Anaphylaxis     Penicillins Anaphylaxis       Breast Cancer Screening:    FHS-7:   Breast CA Risk Assessment (FHS-7) 4/24/2022   Did any of your first-degree relatives have breast or ovarian cancer? No   Did any of your relatives have bilateral breast cancer? No   Did any man in your family have breast cancer? No   Did any woman in your family have breast and  ovarian cancer? Yes   Did any woman in your family have breast cancer before age 50 y? No   Do you have 2 or more relatives with breast and/or ovarian cancer? No   Do you have 2 or more relatives with breast and/or bowel cancer? No       Patient under 40 years of age: Routine Mammogram Screening not recommended.   Pertinent mammograms are reviewed under the imaging tab.    History of abnormal Pap smear: NO - age 30-65 PAP every 5 years with negative HPV co-testing recommended  PAP / HPV Latest Ref Rng & Units 1/4/2019 12/29/2015   PAP (Historical) - NIL NIL   HPV16 NEG:Negative Negative Negative   HPV18 NEG:Negative Negative Negative   HRHPV NEG:Negative Negative Negative     Reviewed and updated as needed this visit by clinical staff                    Reviewed and updated as needed this visit by Provider                   Past Medical History:   Diagnosis Date     IVETT I (cervical intraepithelial neoplasia I)     Pt reported      Past Surgical History:   Procedure Laterality Date     BIOPSY       COLPOSCOPY CERVIX, BIOPSY CERVIX, ENDOCERVICAL CURETTAGE, COMBINED  06/24/2008 2011       Review of Systems   Constitutional: Negative for chills and fever.   HENT: Negative for congestion, ear pain, hearing loss and sore throat.    Eyes: Negative for pain and visual disturbance.   Respiratory: Negative for cough and shortness of breath.    Cardiovascular: Negative for chest pain, palpitations and peripheral edema.   Gastrointestinal: Negative for abdominal pain, constipation, diarrhea, heartburn, hematochezia and nausea.   Breasts:  Negative for tenderness, breast mass and discharge.   Genitourinary: Negative for dysuria, frequency, genital sores, hematuria, pelvic pain, urgency, vaginal bleeding and vaginal discharge.   Musculoskeletal: Negative for arthralgias, joint swelling and myalgias.   Skin: Negative for rash.   Neurological: Negative for dizziness, weakness, headaches and paresthesias.  "  Psychiatric/Behavioral: Negative for mood changes. The patient is not nervous/anxious.      OBJECTIVE:   /84   Pulse 92   Temp 98.1  F (36.7  C) (Tympanic)   Resp 14   Ht 1.638 m (5' 4.5\")   Wt 90.5 kg (199 lb 9.6 oz)   LMP 04/04/2022 (Approximate)   SpO2 99%   BMI 33.73 kg/m    Physical Exam  GENERAL: healthy, alert and no distress  EYES: Eyes grossly normal to inspection and conjunctivae and sclerae normal  HENT: ear canals and TM's normal, nose and mouth without ulcers or lesions  NECK: no adenopathy, no asymmetry, masses, or scars and thyroid normal to palpation  RESP: lungs clear to auscultation - no rales, rhonchi or wheezes  BREAST: normal without masses, tenderness or nipple discharge and no palpable axillary masses or adenopathy  CV: regular rates and rhythm, no murmur, click or rub, peripheral pulses strong and no peripheral edema  ABDOMEN: soft, nontender, no hepatosplenomegaly, no masses and bowel sounds normal   (female): normal female external genitalia, normal urethral meatus, vaginal mucosa pink, moist, well rugated, and normal cervix/adnexa/uterus without masses or discharge  MS: no gross musculoskeletal defects noted, no edema  SKIN: no suspicious lesions or rashes  NEURO: Normal strength and tone, mentation intact and speech normal  PSYCH: mentation appears normal, affect normal/bright  LYMPH: no cervical, supraclavicular, axillary, or inguinal adenopathy    Diagnostic Test Results:  Labs reviewed in Epic  Results for orders placed or performed in visit on 04/25/22   Lipid panel reflex to direct LDL Fasting     Status: Abnormal   Result Value Ref Range    Cholesterol 167 <200 mg/dL    Triglycerides 403 (H) <150 mg/dL    Direct Measure HDL 30 (L) >=50 mg/dL    LDL Cholesterol Calculated      Non HDL Cholesterol 137 (H) <130 mg/dL    Patient Fasting > 8hrs? Yes     Narrative    Cholesterol  Desirable:  <200 mg/dL    Triglycerides  Normal:  Less than 150 mg/dL  Borderline High:  " 150-199 mg/dL  High:  200-499 mg/dL  Very High:  Greater than or equal to 500 mg/dL    Direct Measure HDL  Female:  Greater than or equal to 50 mg/dL   Male:  Greater than or equal to 40 mg/dL    LDL Cholesterol  Desirable:  <100mg/dL  Above Desirable:  100-129 mg/dL   Borderline High:  130-159 mg/dL   High:  160-189 mg/dL   Very High:  >= 190 mg/dL    Non HDL Cholesterol  Desirable:  130 mg/dL  Above Desirable:  130-159 mg/dL  Borderline High:  160-189 mg/dL  High:  190-219 mg/dL  Very High:  Greater than or equal to 220 mg/dL   Comprehensive metabolic panel     Status: Abnormal   Result Value Ref Range    Sodium 137 133 - 144 mmol/L    Potassium 4.2 3.4 - 5.3 mmol/L    Chloride 106 94 - 109 mmol/L    Carbon Dioxide (CO2) 22 20 - 32 mmol/L    Anion Gap 9 3 - 14 mmol/L    Urea Nitrogen 13 7 - 30 mg/dL    Creatinine 0.76 0.52 - 1.04 mg/dL    Calcium 8.8 8.5 - 10.1 mg/dL    Glucose 157 (H) 70 - 99 mg/dL    Alkaline Phosphatase 70 40 - 150 U/L    AST 15 0 - 45 U/L    ALT 14 0 - 50 U/L    Protein Total 7.5 6.8 - 8.8 g/dL    Albumin 3.6 3.4 - 5.0 g/dL    Bilirubin Total 0.2 0.2 - 1.3 mg/dL    GFR Estimate >90 >60 mL/min/1.73m2   Hemoglobin A1c     Status: Abnormal   Result Value Ref Range    Hemoglobin A1C 6.3 (H) 0.0 - 5.6 %   TSH with free T4 reflex     Status: Normal   Result Value Ref Range    TSH 3.88 0.40 - 4.00 mU/L   LDL cholesterol direct     Status: Normal   Result Value Ref Range    LDL Cholesterol Direct 68 <100 mg/dL       ASSESSMENT/PLAN:   Nancie was seen today for physical.    Diagnoses and all orders for this visit:    Routine general medical examination at a health care facility  -     REVIEW OF HEALTH MAINTENANCE PROTOCOL ORDERS    Encounter for initial prescription of contraceptive pills  -     desogestrel-ethinyl estradiol (ISIBLOOM) 0.15-30 MG-MCG tablet; Take 1 tablet by mouth daily  The current medical regimen is effective.  Continue current medication regimen unchanged.    CARDIOVASCULAR  "SCREENING; LDL GOAL LESS THAN 160  -     Lipid panel reflex to direct LDL Fasting; Future  -     Lipid panel reflex to direct LDL Fasting  -     LDL cholesterol direct    PCOS (polycystic ovarian syndrome)  -     Comprehensive metabolic panel; Future  -     Hemoglobin A1c; Future  Will follow up and/or notify patient of  results via My Chart to determine further need for followup      Thyroid nodule  -     TSH with free T4 reflex; Future  -     US Thyroid; Future  -     TSH with free T4 reflex    Screening for diabetes mellitus (DM)  -     Comprehensive metabolic panel; Future  -     Hemoglobin A1c; Future  -     Comprehensive metabolic panel  -     Hemoglobin A1c    Screening for thyroid disorder  -     TSH with free T4 reflex; Future  -     TSH with free T4 reflex      PLAN:   Patient needs to follow up in if no improvement,or sooner if worsening of symptoms or other symptoms develop.  Schedule thyroid ultrasound   I will place order. Please call [phone] to schedule.  Will follow up and/or notify patient of  results via My Chart to determine further need for followup      Patient has been advised of split billing requirements and indicates understanding: Yes    COUNSELING:  Reviewed preventive health counseling, as reflected in patient instructions  Special attention given to:        Regular exercise       Healthy diet/nutrition       Vision screening       Contraception       Family planning    Estimated body mass index is 33.95 kg/m  as calculated from the following:    Height as of 3/29/21: 1.638 m (5' 4.5\").    Weight as of 3/29/21: 91.1 kg (200 lb 14.4 oz).        She reports that she has never smoked. She has never used smokeless tobacco.      Counseling Resources:  ATP IV Guidelines  Pooled Cohorts Equation Calculator  Breast Cancer Risk Calculator  BRCA-Related Cancer Risk Assessment: FHS-7 Tool  FRAX Risk Assessment  ICSI Preventive Guidelines  Dietary Guidelines for Americans, 2010  USDA's " MyPlate  ASA Prophylaxis  Lung CA Screening    EMANI Olivas CNP  [facility]

## 2022-04-26 NOTE — RESULT ENCOUNTER NOTE
Jayshree Gonzalez,    Attached are your test results.  -HDL(good) cholesterol level is low and your triglycerides are elevated which can increase your heart disease risk.  A diet high in fat and simple carbohydrates, genetics and being overweight can contribute to this. LDL(bad) cholesterol level is normal.  ADVISE:exercising 150 minutes of aerobic exercise per week (30 minutes 5 days per week or 50 minutes 3 days per week are options), and omega-3 fatty acids (fish oil)  daily are helpful to improve this.  In 12 months, you should recheck your fasting cholesterol panel by scheduling a lab-only appointment.  -Liver and gallbladder tests (ALT,AST, Alk phos,bilirubin) are normal.  -Kidney function (GFR) is normal.  -Sodium is normal.  -Potassium is normal.  -Calcium is normal.  -Glucose is slight elevated and may be a sign of early diabetes (prediabetes). ADVISE:: eating a low carbohydrate diet, exercising, trying to lose weight (if necessary) and rechecking your glucose level in 12 months.  -TSH (thyroid stimulating hormone) level is normal which indicates normal thyroid function.   Please contact us if you have any questions.    Balbina Armstrong, CNP

## 2022-05-01 ENCOUNTER — HEALTH MAINTENANCE LETTER (OUTPATIENT)
Age: 37
End: 2022-05-01

## 2022-07-06 ENCOUNTER — ANCILLARY PROCEDURE (OUTPATIENT)
Dept: ULTRASOUND IMAGING | Facility: CLINIC | Age: 37
End: 2022-07-06
Attending: NURSE PRACTITIONER
Payer: COMMERCIAL

## 2022-07-06 DIAGNOSIS — E04.1 THYROID NODULE: ICD-10-CM

## 2022-07-06 PROCEDURE — 76536 US EXAM OF HEAD AND NECK: CPT | Performed by: RADIOLOGY

## 2022-11-21 ENCOUNTER — HEALTH MAINTENANCE LETTER (OUTPATIENT)
Age: 37
End: 2022-11-21

## 2023-02-24 ENCOUNTER — TELEPHONE (OUTPATIENT)
Dept: FAMILY MEDICINE | Facility: CLINIC | Age: 38
End: 2023-02-24
Payer: COMMERCIAL

## 2023-02-24 DIAGNOSIS — U07.1 INFECTION DUE TO 2019 NOVEL CORONAVIRUS: Primary | ICD-10-CM

## 2023-02-24 NOTE — TELEPHONE ENCOUNTER
RN COVID TREATMENT VISIT  02/24/23      The patient has been triaged and does not require a higher level of care.    Nancie Gonzalez  37 year old  Current weight? 195    Has the patient been seen by a primary care provider at an [facility] or [facility] within the past two years? Yes.   Have you been in close proximity to/do you have a known exposure to a person with a confirmed case of influenza? No.     General treatment eligibility:  Date of positive COVID test (PCR or at home)?  02/24/23      Are you or have you been hospitalized for this COVID-19 infection? No.   Have you received monoclonal antibodies or antiviral treatment for COVID-19 since this positive test? No.   Do you have any of the following conditions that place you at risk of being very sick from COVID-19?   - Overweight or Obesity (BMI >85th percentile or BMI 25 or higher)  Yes, patient has at least one high risk condition as noted above.     Current COVID symptoms:   - fever or chills  - cough  - fatigue  - muscle or body aches  - headache  - sore throat  - congestion or runny nose  - nausea or vomiting  Yes. Patient has at least one symptom as selected.     How many days since symptoms started? 5 days or less. Established patient, 12 years or older weighing at least 88.2 lbs, who has symptoms that started in the past 5 days, has not been hospitalized nor received treatment already, and is at risk for being very sick from COVID-19.     Treatment eligibility by RN:    Are you currently pregnant or nursing? No    Do you have a clinically significant hypersensitivity to nirmatrelvir or ritonavir, or toxic epidermal necrolysis (TEN) or Walters-Doni Syndrome? No    Do you have a history of hepatitis, any hepatic impairment on the Problem List (such as Child-Cullen Class C, cirrhosis, fatty liver disease, alcoholic liver disease), or was the last liver lab (hepatic panel, ALT, AST, ALK Phos, bilirubin) elevated in the past 6  months? No    Do you have any history of severe renal impairment (eGFR < 30mL/min)? No    Is patient eligible to continue?   Yes, patient meets all eligibility requirements for the RN COVID treatment (as denoted by all no responses above).     Current Outpatient Medications   Medication Sig Dispense Refill     desogestrel-ethinyl estradiol (ISIBLOOM) 0.15-30 MG-MCG tablet Take 1 tablet by mouth daily 84 tablet 4       Medications from List 1 of the standing order (on medications that exclude the use of Paxlovid) that patient is taking: NONE. Is patient taking Molalla's Wort? No  Is patient taking Molalla's Wort or any meds from List 1? No.   Medications from List 2 of the standing order (on meds that provider needs to adjust) that patient is taking: NONE. Is patient on any of the meds from List 2? No.   Medications from List 3 of standing order (on meds that a RN needs to adjust) that patient is taking: NONE. Is patient on any meds from List 3? No.     Paxlovid has an approximate 90% reduction in hospitalization. Paxlovid can possibly cause altered sense of taste, diarrhea (loose, watery stools), high blood pressure, muscle aches.     Would patient like a Paxlovid prescription?   Yes.   Lab Results   Component Value Date    GFRESTIMATED >90 04/25/2022       Was last eGFR reduced? No, eGFR 60 or greater/ No Result on record. Patient can receive the normal renal function dose. Paxlovid Rx sent to [facility]   [facility] [phone] [id number]  [place] [address]    Hours:  Mon-Fri: 9:00a - 5:00p    [facility]    Temporary change to home medications: None    All medication adjustments (holds, etc) were discussed with the patient and patient was asked to repeat back (teachback) their med adjustment.  Did patient understand med adjustment? No medication adjustments needed.         Reviewed the following instructions with the patient:    Paxlovid (nimatrelvir  and ritonavir)    How it works  Two medicines (nirmatrelvir and ritonavir) are taken together. They stop the virus from growing. Less amount of virus is easier for your body to fight.    How to take    Medicine comes in a daily container with both medicine tablets. Take by mouth twice daily (once in the morning, once at night) for 5 days.    The number of tablets to take varies by patient.    Don't chew or break capsules. Swallow whole.    When to take  Take as soon as possible after positive COVID-19 test result, and within 5 days of your first symptoms.    Possible side effects  Can cause altered sense of taste, diarrhea (loose, watery stools), high blood pressure, muscle aches.    Patient verbalized good understanding of medication directions, how medication works and possible side effects.     RN advised on red flag symptom that would require evaluation in the ED including fever greater than 104, shortness of breath, difficulty breathing, chest pain, or heart palpitations. Patient verbalized understanding.     Saba Kevin RN

## 2023-03-29 ASSESSMENT — ENCOUNTER SYMPTOMS
HEMATOCHEZIA: 0
NAUSEA: 0
PALPITATIONS: 0
DIARRHEA: 0
CHILLS: 0
DIZZINESS: 0
MYALGIAS: 0
SHORTNESS OF BREATH: 0
CONSTIPATION: 0
HEARTBURN: 0
FEVER: 0
ARTHRALGIAS: 0
COUGH: 0
ABDOMINAL PAIN: 0
FREQUENCY: 0
HEADACHES: 0
DYSURIA: 0
HEMATURIA: 0
JOINT SWELLING: 0
BREAST MASS: 0
PARESTHESIAS: 0
WEAKNESS: 0
EYE PAIN: 0
NERVOUS/ANXIOUS: 0
SORE THROAT: 0

## 2023-03-30 ENCOUNTER — OFFICE VISIT (OUTPATIENT)
Dept: FAMILY MEDICINE | Facility: CLINIC | Age: 38
End: 2023-03-30
Payer: COMMERCIAL

## 2023-03-30 VITALS
HEART RATE: 85 BPM | DIASTOLIC BLOOD PRESSURE: 88 MMHG | SYSTOLIC BLOOD PRESSURE: 129 MMHG | WEIGHT: 192.3 LBS | HEIGHT: 65 IN | TEMPERATURE: 98.9 F | BODY MASS INDEX: 32.04 KG/M2 | OXYGEN SATURATION: 99 %

## 2023-03-30 DIAGNOSIS — Z01.84 IMMUNITY STATUS TESTING: ICD-10-CM

## 2023-03-30 DIAGNOSIS — E28.2 PCOS (POLYCYSTIC OVARIAN SYNDROME): ICD-10-CM

## 2023-03-30 DIAGNOSIS — Z13.29 SCREENING FOR THYROID DISORDER: ICD-10-CM

## 2023-03-30 DIAGNOSIS — Z13.1 SCREENING FOR DIABETES MELLITUS (DM): ICD-10-CM

## 2023-03-30 DIAGNOSIS — Z30.011 ENCOUNTER FOR INITIAL PRESCRIPTION OF CONTRACEPTIVE PILLS: ICD-10-CM

## 2023-03-30 DIAGNOSIS — Z13.0 SCREENING FOR DISORDER OF BLOOD AND BLOOD-FORMING ORGANS: ICD-10-CM

## 2023-03-30 DIAGNOSIS — Z13.6 CARDIOVASCULAR SCREENING; LDL GOAL LESS THAN 160: ICD-10-CM

## 2023-03-30 DIAGNOSIS — Z00.00 ROUTINE GENERAL MEDICAL EXAMINATION AT A HEALTH CARE FACILITY: Primary | ICD-10-CM

## 2023-03-30 LAB
ALBUMIN SERPL-MCNC: 3.6 G/DL (ref 3.4–5)
ALP SERPL-CCNC: 67 U/L (ref 40–150)
ALT SERPL W P-5'-P-CCNC: 13 U/L (ref 0–50)
ANION GAP SERPL CALCULATED.3IONS-SCNC: 5 MMOL/L (ref 3–14)
AST SERPL W P-5'-P-CCNC: 14 U/L (ref 0–45)
BILIRUB SERPL-MCNC: 0.3 MG/DL (ref 0.2–1.3)
BUN SERPL-MCNC: 10 MG/DL (ref 7–30)
CALCIUM SERPL-MCNC: 8.9 MG/DL (ref 8.5–10.1)
CHLORIDE BLD-SCNC: 106 MMOL/L (ref 94–109)
CHOLEST SERPL-MCNC: 176 MG/DL
CO2 SERPL-SCNC: 25 MMOL/L (ref 20–32)
CREAT SERPL-MCNC: 0.76 MG/DL (ref 0.52–1.04)
ERYTHROCYTE [DISTWIDTH] IN BLOOD BY AUTOMATED COUNT: 12.5 % (ref 10–15)
FASTING STATUS PATIENT QL REPORTED: YES
GFR SERPL CREATININE-BSD FRML MDRD: >90 ML/MIN/1.73M2
GLUCOSE BLD-MCNC: 133 MG/DL (ref 70–99)
HBA1C MFR BLD: 6.8 % (ref 0–5.6)
HBV CORE AB SERPL QL IA: NONREACTIVE
HBV SURFACE AB SERPL IA-ACNC: 0.4 M[IU]/ML
HBV SURFACE AB SERPL IA-ACNC: NONREACTIVE M[IU]/ML
HBV SURFACE AG SERPL QL IA: NONREACTIVE
HCT VFR BLD AUTO: 42.4 % (ref 35–47)
HDLC SERPL-MCNC: 27 MG/DL
HGB BLD-MCNC: 14.5 G/DL (ref 11.7–15.7)
LDLC SERPL CALC-MCNC: 60 MG/DL
LDLC SERPL CALC-MCNC: ABNORMAL MG/DL
MCH RBC QN AUTO: 28 PG (ref 26.5–33)
MCHC RBC AUTO-ENTMCNC: 34.2 G/DL (ref 31.5–36.5)
MCV RBC AUTO: 82 FL (ref 78–100)
NONHDLC SERPL-MCNC: 149 MG/DL
PLATELET # BLD AUTO: 263 10E3/UL (ref 150–450)
POTASSIUM BLD-SCNC: 4.4 MMOL/L (ref 3.4–5.3)
PROT SERPL-MCNC: 7.5 G/DL (ref 6.8–8.8)
RBC # BLD AUTO: 5.17 10E6/UL (ref 3.8–5.2)
SODIUM SERPL-SCNC: 136 MMOL/L (ref 133–144)
TRIGL SERPL-MCNC: 717 MG/DL
TSH SERPL DL<=0.005 MIU/L-ACNC: 2.88 MU/L (ref 0.4–4)
WBC # BLD AUTO: 8.6 10E3/UL (ref 4–11)

## 2023-03-30 PROCEDURE — 80061 LIPID PANEL: CPT | Performed by: NURSE PRACTITIONER

## 2023-03-30 PROCEDURE — 85027 COMPLETE CBC AUTOMATED: CPT | Performed by: NURSE PRACTITIONER

## 2023-03-30 PROCEDURE — 84443 ASSAY THYROID STIM HORMONE: CPT | Performed by: NURSE PRACTITIONER

## 2023-03-30 PROCEDURE — 99395 PREV VISIT EST AGE 18-39: CPT | Performed by: NURSE PRACTITIONER

## 2023-03-30 PROCEDURE — 86706 HEP B SURFACE ANTIBODY: CPT | Performed by: NURSE PRACTITIONER

## 2023-03-30 PROCEDURE — 80053 COMPREHEN METABOLIC PANEL: CPT | Performed by: NURSE PRACTITIONER

## 2023-03-30 PROCEDURE — 83036 HEMOGLOBIN GLYCOSYLATED A1C: CPT | Performed by: NURSE PRACTITIONER

## 2023-03-30 PROCEDURE — 83721 ASSAY OF BLOOD LIPOPROTEIN: CPT | Mod: 59 | Performed by: NURSE PRACTITIONER

## 2023-03-30 PROCEDURE — 36415 COLL VENOUS BLD VENIPUNCTURE: CPT | Performed by: NURSE PRACTITIONER

## 2023-03-30 PROCEDURE — 86704 HEP B CORE ANTIBODY TOTAL: CPT | Performed by: NURSE PRACTITIONER

## 2023-03-30 PROCEDURE — 87340 HEPATITIS B SURFACE AG IA: CPT | Performed by: NURSE PRACTITIONER

## 2023-03-30 RX ORDER — DESOGESTREL AND ETHINYL ESTRADIOL 0.15-0.03
1 KIT ORAL DAILY
Qty: 84 TABLET | Refills: 4 | Status: SHIPPED | OUTPATIENT
Start: 2023-03-30 | End: 2024-03-29

## 2023-03-30 ASSESSMENT — ENCOUNTER SYMPTOMS
DYSURIA: 0
MYALGIAS: 0
FREQUENCY: 0
ABDOMINAL PAIN: 0
FEVER: 0
EYE PAIN: 0
SHORTNESS OF BREATH: 0
NERVOUS/ANXIOUS: 0
BREAST MASS: 0
DIARRHEA: 0
NAUSEA: 0
JOINT SWELLING: 0
DIZZINESS: 0
SORE THROAT: 0
HEMATURIA: 0
CHILLS: 0
HEARTBURN: 0
PARESTHESIAS: 0
CONSTIPATION: 0
ARTHRALGIAS: 0
HEMATOCHEZIA: 0
COUGH: 0
PALPITATIONS: 0
HEADACHES: 0
WEAKNESS: 0

## 2023-03-30 NOTE — PATIENT INSTRUCTIONS
PLAN:   1.   Symptomatic therapy suggested: Increase calcium to 1000mg and 1000iu Vit D  Continue current medications as prescribed.   2.  Orders Placed This Encounter   Medications    desogestrel-ethinyl estradiol (ISIBLOOM) 0.15-30 MG-MCG tablet     Sig: Take 1 tablet by mouth daily     Dispense:  84 tablet     Refill:  4     Orders Placed This Encounter   Procedures    REVIEW OF HEALTH MAINTENANCE PROTOCOL ORDERS    Lipid panel reflex to direct LDL Fasting    Comprehensive metabolic panel    CBC with platelets    TSH with free T4 reflex    Hemoglobin A1c    Hepatitis B Surface Antibody    HEPATITIS B CORE ANTIBODY    HEPATITIS B SURFACE ANTIGEN       3. Patient needs to follow up in if no improvement,or sooner if worsening of symptoms or other symptoms develop.  Work on weight loss  Regular exercise  Will follow up and/or notify patient of  results via My Chart to determine further need for followup  Follow up office visit in one year for annual health maintenance exam, sooner PRN.    Preventive Health Recommendations  Female Ages 26 - 39  Yearly exam:   See your health care provider every year in order to  Review health changes.   Discuss preventive care.    Review your medicines if you your doctor has prescribed any.    Until age 30: Get a Pap test every three years (more often if you have had an abnormal result).    After age 30: Talk to your doctor about whether you should have a Pap test every 3 years or have a Pap test with HPV screening every 5 years.   You do not need a Pap test if your uterus was removed (hysterectomy) and you have not had cancer.  You should be tested each year for STDs (sexually transmitted diseases), if you're at risk.   Talk to your provider about how often to have your cholesterol checked.  If you are at risk for diabetes, you should have a diabetes test (fasting glucose).  Shots: Get a flu shot each year. Get a tetanus shot every 10 years.   Nutrition:   Eat at least 5 servings of  fruits and vegetables each day.  Eat whole-grain bread, whole-wheat pasta and brown rice instead of white grains and rice.  Get adequate Calcium and Vitamin D.     Lifestyle  Exercise at least 150 minutes a week (30 minutes a day, 5 days of the week). This will help you control your weight and prevent disease.  Limit alcohol to one drink per day.  No smoking.   Wear sunscreen to prevent skin cancer.  See your dentist every six months for an exam and cleaning.

## 2023-03-30 NOTE — PROGRESS NOTES
SUBJECTIVE:   CC: Nancie is an 37 year old who presents for preventive health visit.   .  Patient has been advised of split billing requirements and indicates understanding: Yes  Healthy Habits:     Getting at least 3 servings of Calcium per day:  Yes    Bi-annual eye exam:  Yes    Dental care twice a year:  Yes    Sleep apnea or symptoms of sleep apnea:  None    Diet:  Vegetarian/vegan    Frequency of exercise:  2-3 days/week    Duration of exercise:  15-30 minutes    Taking medications regularly:  Yes    Medication side effects:  Not applicable    PHQ-2 Total Score: 0    Additional concerns today:  No      Today's PHQ-2 Score:   PHQ-2 ( 1999 Pf[employer]) 3/29/2023   Q1: Little interest or pleasure in doing things 0   Q2: Feeling down, depressed or hopeless 0   PHQ-2 Score 0   PHQ-2 Total Score (12-17 Years)- Positive if 3 or more points; Administer PHQ-A if positive -   Q1: Little interest or pleasure in doing things Not at all   Q2: Feeling down, depressed or hopeless Not at all   PHQ-2 Score 0           Social History     Tobacco Use     Smoking status: Never     Smokeless tobacco: Never   Vaping Use     Vaping status: Never Used   Substance Use Topics     Alcohol use: Yes     Comment: Rarely         Alcohol Use 3/29/2023   Prescreen: >3 drinks/day or >7 drinks/week? No     Reviewed orders with patient.  Reviewed health maintenance and updated orders accordingly - Yes  Labs reviewed in EPIC  BP Readings from Last 3 Encounters:   03/30/23 129/88   04/25/22 126/84   03/29/21 129/88    Wt Readings from Last 3 Encounters:   03/30/23 87.2 kg (192 lb 4.8 oz)   04/25/22 90.5 kg (199 lb 9.6 oz)   03/29/21 91.1 kg (200 lb 14.4 oz)                  Patient Active Problem List   Diagnosis     History of abnormal cervical Pap smear     History of HPV infection     CARDIOVASCULAR SCREENING; LDL GOAL LESS THAN 160     Non morbid obesity, unspecified obesity type     Dysplasia of cervix, low grade (IVETT 1)      Hypertriglyceridemia     Elevated fasting glucose     PCOS (polycystic ovarian syndrome)     Dyslipidemia     Cervical cancer screening     Obesity (BMI 30-39.9)     Past Surgical History:   Procedure Laterality Date     BIOPSY       COLPOSCOPY CERVIX, BIOPSY CERVIX, ENDOCERVICAL CURETTAGE, COMBINED  06/24/2008 2011       Social History     Tobacco Use     Smoking status: Never     Smokeless tobacco: Never   Substance Use Topics     Alcohol use: Yes     Comment: Rarely     Family History   Problem Relation Age of Onset     Hyperlipidemia Mother      Hypertension Mother      Hypertension Father      Hyperlipidemia Brother      Diabetes Maternal Grandmother      Ovarian Cancer Maternal Grandmother      Other Cancer Maternal Grandmother         Overian     Coronary Artery Disease Maternal Grandfather      Hyperlipidemia Brother      Cerebrovascular Disease No family hx of      Thyroid Disease No family hx of      Breast Cancer No family hx of      Uterine Cancer No family hx of      Prostate Cancer No family hx of      Colorectal Cancer No family hx of      Pancreatic Cancer No family hx of      Lung Cancer No family hx of      Melanoma No family hx of      Autoimmune Disease No family hx of          Current Outpatient Medications   Medication Sig Dispense Refill     desogestrel-ethinyl estradiol (ISIBLOOM) 0.15-30 MG-MCG tablet Take 1 tablet by mouth daily 84 tablet 4     Allergies   Allergen Reactions     Amoxicillin Anaphylaxis     Penicillins Anaphylaxis       Breast Cancer Screening:    FHS-7:       4/24/2022     7:21 AM 3/29/2023    10:30 AM   Breast CA Risk Assessment (FHS-7)   Did any of your first-degree relatives have breast or ovarian cancer? No No   Did any of your relatives have bilateral breast cancer? No No   Did any man in your family have breast cancer? No No   Did any woman in your family have breast and ovarian cancer? Yes No   Did any woman in your family have breast cancer before age 50 y? No No    Do you have 2 or more relatives with breast and/or ovarian cancer? No No   Do you have 2 or more relatives with breast and/or bowel cancer? No No       Patient under 40 years of age: Routine Mammogram Screening not recommended.   Pertinent mammograms are reviewed under the imaging tab.    History of abnormal Pap smear: NO - age 30-65 PAP every 5 years with negative HPV co-testing recommended      Latest Ref Rng & Units 1/4/2019     9:00 AM 12/29/2015     8:40 AM 12/29/2015    12:00 AM   PAP / HPV   PAP (Historical)  NIL    NIL     HPV 16 DNA NEG^Negative Negative   Negative      HPV 18 DNA NEG^Negative Negative   Negative      Other HR HPV NEG^Negative Negative   Negative        Reviewed and updated as needed this visit by clinical staff   Tobacco  Allergies  Meds              Reviewed and updated as needed this visit by Provider                 Past Medical History:   Diagnosis Date     IVETT I (cervical intraepithelial neoplasia I)     Pt reported      Past Surgical History:   Procedure Laterality Date     BIOPSY       COLPOSCOPY CERVIX, BIOPSY CERVIX, ENDOCERVICAL CURETTAGE, COMBINED  06/24/2008 2011       Review of Systems   Constitutional: Negative for chills and fever.   HENT: Negative for congestion, ear pain, hearing loss and sore throat.    Eyes: Negative for pain and visual disturbance.   Respiratory: Negative for cough and shortness of breath.    Cardiovascular: Negative for chest pain, palpitations and peripheral edema.   Gastrointestinal: Negative for abdominal pain, constipation, diarrhea, heartburn, hematochezia and nausea.   Breasts:  Negative for tenderness, breast mass and discharge.   Genitourinary: Negative for dysuria, frequency, genital sores, hematuria, pelvic pain, urgency, vaginal bleeding and vaginal discharge.   Musculoskeletal: Negative for arthralgias, joint swelling and myalgias.   Skin: Negative for rash.   Neurological: Negative for dizziness, weakness, headaches and  "paresthesias.   Psychiatric/Behavioral: Negative for mood changes. The patient is not nervous/anxious.         OBJECTIVE:   /88 (BP Location: Right arm, Patient Position: Sitting, Cuff Size: Adult Regular)   Pulse 85   Temp 98.9  F (37.2  C) (Oral)   Ht 1.64 m (5' 4.57\")   Wt 87.2 kg (192 lb 4.8 oz)   LMP 03/13/2023 (Approximate)   SpO2 99%   BMI 32.43 kg/m    Physical Exam  GENERAL: healthy, alert and no distress  EYES: Eyes grossly normal to inspection and conjunctivae and sclerae normal  HENT: ear canals and TM's normal, nose and mouth without ulcers or lesions  NECK: no adenopathy, no asymmetry, masses, or scars and thyroid normal to palpation  RESP: lungs clear to auscultation - no rales, rhonchi or wheezes  BREAST: normal without masses, tenderness or nipple discharge and no palpable axillary masses or adenopathy  CV: regular rates and rhythm, no murmur, click or rub, peripheral pulses strong and no peripheral edema  ABDOMEN: soft, nontender, no hepatosplenomegaly, no masses and bowel sounds normal   (female): normal female external genitalia, normal urethral meatus, vaginal mucosa pink, moist, well rugated, and normal cervix/adnexa/uterus without masses or discharge  MS: no gross musculoskeletal defects noted, no edema  SKIN: no suspicious lesions or rashes  NEURO: Normal strength and tone, mentation intact and speech normal  PSYCH: mentation appears normal, affect normal/bright  LYMPH: no cervical, supraclavicular, axillary, or inguinal adenopathy    Diagnostic Test Results:  Labs reviewed in Epic  Results for orders placed or performed in visit on 03/30/23   Hepatitis B Surface Antibody     Status: None   Result Value Ref Range    Hepatitis B Surface Antibody Instrument Value 0.40 <8.00 m[IU]/mL    Hepatitis B Surface Antibody Nonreactive    HEPATITIS B CORE ANTIBODY     Status: Normal   Result Value Ref Range    Hepatitis B Core Antibody Total Nonreactive Nonreactive   HEPATITIS B SURFACE " ANTIGEN     Status: Normal   Result Value Ref Range    Hepatitis B Surface Antigen Nonreactive Nonreactive   Lipid panel reflex to direct LDL Fasting     Status: Abnormal   Result Value Ref Range    Cholesterol 176 <200 mg/dL    Triglycerides 717 (H) <150 mg/dL    Direct Measure HDL 27 (L) >=50 mg/dL    LDL Cholesterol Calculated      Non HDL Cholesterol 149 (H) <130 mg/dL    Patient Fasting > 8hrs? Yes     Narrative    Cholesterol  Desirable:  <200 mg/dL    Triglycerides  Normal:  Less than 150 mg/dL  Borderline High:  150-199 mg/dL  High:  200-499 mg/dL  Very High:  Greater than or equal to 500 mg/dL    Direct Measure HDL  Female:  Greater than or equal to 50 mg/dL   Male:  Greater than or equal to 40 mg/dL    LDL Cholesterol  Desirable:  <100mg/dL  Above Desirable:  100-129 mg/dL   Borderline High:  130-159 mg/dL   High:  160-189 mg/dL   Very High:  >= 190 mg/dL    Non HDL Cholesterol  Desirable:  130 mg/dL  Above Desirable:  130-159 mg/dL  Borderline High:  160-189 mg/dL  High:  190-219 mg/dL  Very High:  Greater than or equal to 220 mg/dL   Comprehensive metabolic panel     Status: Abnormal   Result Value Ref Range    Sodium 136 133 - 144 mmol/L    Potassium 4.4 3.4 - 5.3 mmol/L    Chloride 106 94 - 109 mmol/L    Carbon Dioxide (CO2) 25 20 - 32 mmol/L    Anion Gap 5 3 - 14 mmol/L    Urea Nitrogen 10 7 - 30 mg/dL    Creatinine 0.76 0.52 - 1.04 mg/dL    Calcium 8.9 8.5 - 10.1 mg/dL    Glucose 133 (H) 70 - 99 mg/dL    Alkaline Phosphatase 67 40 - 150 U/L    AST 14 0 - 45 U/L    ALT 13 0 - 50 U/L    Protein Total 7.5 6.8 - 8.8 g/dL    Albumin 3.6 3.4 - 5.0 g/dL    Bilirubin Total 0.3 0.2 - 1.3 mg/dL    GFR Estimate >90 >60 mL/min/1.73m2   CBC with platelets     Status: Normal   Result Value Ref Range    WBC Count 8.6 4.0 - 11.0 10e3/uL    RBC Count 5.17 3.80 - 5.20 10e6/uL    Hemoglobin 14.5 11.7 - 15.7 g/dL    Hematocrit 42.4 35.0 - 47.0 %    MCV 82 78 - 100 fL    MCH 28.0 26.5 - 33.0 pg    MCHC 34.2 31.5 - 36.5  g/dL    RDW 12.5 10.0 - 15.0 %    Platelet Count 263 150 - 450 10e3/uL   TSH with free T4 reflex     Status: Normal   Result Value Ref Range    TSH 2.88 0.40 - 4.00 mU/L   Hemoglobin A1c     Status: Abnormal   Result Value Ref Range    Hemoglobin A1C 6.8 (H) 0.0 - 5.6 %   LDL cholesterol direct     Status: Normal   Result Value Ref Range    LDL Cholesterol Direct 60 <100 mg/dL       ASSESSMENT/PLAN:   Nancie was seen today for physical.    Diagnoses and all orders for this visit:    Routine general medical examination at a health care facility  -     REVIEW OF HEALTH MAINTENANCE PROTOCOL ORDERS    Encounter for initial prescription of contraceptive pills  -     desogestrel-ethinyl estradiol (ISIBLOOM) 0.15-30 MG-MCG tablet; Take 1 tablet by mouth daily    CARDIOVASCULAR SCREENING; LDL GOAL LESS THAN 160  -     Lipid panel reflex to direct LDL Fasting; Future    Screening for diabetes mellitus (DM)  -     Comprehensive metabolic panel; Future    Screening for disorder of blood and blood-forming organs  -     CBC with platelets; Future    Screening for thyroid disorder  -     TSH with free T4 reflex; Future    PCOS (polycystic ovarian syndrome)  -     Hemoglobin A1c; Future    Immunity status testing  -     Hepatitis B Surface Antibody  -     HEPATITIS B CORE ANTIBODY  -     HEPATITIS B SURFACE ANTIGEN    PLAN:   Patient needs to follow up in if no improvement,or sooner if worsening of symptoms or other symptoms develop.  Work on weight loss  Regular exercise  Will follow up and/or notify patient of  results via My Chart to determine further need for followup  Follow up office visit in one year for annual health maintenance exam, sooner PRN.    Patient has been advised of split billing requirements and indicates understanding: Yes      COUNSELING:  Reviewed preventive health counseling, as reflected in patient instructions  Special attention given to:        Regular exercise       Healthy diet/nutrition       Vision  "screening       Contraception       Family planning       Osteoporosis prevention/bone health      BMI:   Estimated body mass index is 32.43 kg/m  as calculated from the following:    Height as of this encounter: 1.64 m (5' 4.57\").    Weight as of this encounter: 87.2 kg (192 lb 4.8 oz).   Weight management plan: Discussed healthy diet and exercise guidelines      She reports that she has never smoked. She has never used smokeless tobacco.          EMANI Olivas [facility]  "

## 2023-04-05 NOTE — RESULT ENCOUNTER NOTE
Jayshree Gonzalez,    Attached are your test results.  -Normal red blood cell (hgb) levels, normal white blood cell count and normal platelet levels.  -HDL(good) cholesterol level is low and your triglycerides are elevated which can increase your heart disease risk.  A diet high in fat and simple carbohydrates, genetics and being overweight can contribute to this. LDL(bad) cholesterol level is normal.  ADVISE:exercising 150 minutes of aerobic exercise per week (30 minutes 5 days per week or 50 minutes 3 days per week are options), and omega-3 fatty acids (fish oil)  daily are helpful to improve this.  In 3 months, you should recheck your fasting cholesterol panel by scheduling a lab-only appointment.  -Liver and gallbladder tests (ALT,AST, Alk phos,bilirubin) are normal.  -Kidney function (GFR) is normal.  -Sodium is normal.  -Potassium is normal.  -Calcium is normal.  -Glucose is elevated due to your diabetes.  A1c is a test which indicates how well your blood sugar has been controlled over the last 6 weeks.  The goal for diabetics without other complications is less than 7. This, along with blood sugar testing suggests that you have diabetes. You should recheck your A1c in 3 months. In the meantime, a healthy diet high in lean protein, whole grain carbohydrates and healthy fats such as olive oil or canola will help mainain a healthy blood sugar    A1c(%) Mean blood sugar (mg/dl)  6 135  7 170  8 205  9 240  10 275  11 310  12 345    Diabetes education referral and Diet and Exercise  We could consider starting Metformin let me know your thoughts on this   Your labs show no antibodies to Hepatitis B so would benefit from the vaccine   -TSH (thyroid stimulating hormone) level is normal which indicates normal thyroid function.     Please contact us if you have any questions.    Balbina Armstrong, CNP

## 2023-05-09 ENCOUNTER — OFFICE VISIT (OUTPATIENT)
Dept: FAMILY MEDICINE | Facility: CLINIC | Age: 38
End: 2023-05-09
Payer: COMMERCIAL

## 2023-05-09 VITALS
TEMPERATURE: 99.6 F | BODY MASS INDEX: 32.11 KG/M2 | SYSTOLIC BLOOD PRESSURE: 140 MMHG | WEIGHT: 192.7 LBS | HEART RATE: 121 BPM | DIASTOLIC BLOOD PRESSURE: 92 MMHG | OXYGEN SATURATION: 97 % | HEIGHT: 65 IN | RESPIRATION RATE: 16 BRPM

## 2023-05-09 DIAGNOSIS — R03.0 ELEVATED BLOOD PRESSURE READING WITHOUT DIAGNOSIS OF HYPERTENSION: ICD-10-CM

## 2023-05-09 DIAGNOSIS — I67.1 CEREBRAL ANEURYSM, NONRUPTURED: ICD-10-CM

## 2023-05-09 DIAGNOSIS — R42 DIZZINESS: Primary | ICD-10-CM

## 2023-05-09 DIAGNOSIS — R00.2 PALPITATIONS: ICD-10-CM

## 2023-05-09 DIAGNOSIS — E78.1 HYPERTRIGLYCERIDEMIA: ICD-10-CM

## 2023-05-09 DIAGNOSIS — E78.6 LOW HDL (UNDER 40): ICD-10-CM

## 2023-05-09 DIAGNOSIS — E66.09 CLASS 1 OBESITY DUE TO EXCESS CALORIES WITHOUT SERIOUS COMORBIDITY WITH BODY MASS INDEX (BMI) OF 32.0 TO 32.9 IN ADULT: ICD-10-CM

## 2023-05-09 DIAGNOSIS — E11.9 TYPE 2 DIABETES MELLITUS WITHOUT COMPLICATION, WITHOUT LONG-TERM CURRENT USE OF INSULIN (H): ICD-10-CM

## 2023-05-09 DIAGNOSIS — E66.811 CLASS 1 OBESITY DUE TO EXCESS CALORIES WITHOUT SERIOUS COMORBIDITY WITH BODY MASS INDEX (BMI) OF 32.0 TO 32.9 IN ADULT: ICD-10-CM

## 2023-05-09 PROBLEM — R73.01 ELEVATED FASTING GLUCOSE: Status: RESOLVED | Noted: 2019-12-21 | Resolved: 2023-05-09

## 2023-05-09 PROCEDURE — 99214 OFFICE O/P EST MOD 30 MIN: CPT | Performed by: INTERNAL MEDICINE

## 2023-05-09 ASSESSMENT — ANXIETY QUESTIONNAIRES
8. IF YOU CHECKED OFF ANY PROBLEMS, HOW DIFFICULT HAVE THESE MADE IT FOR YOU TO DO YOUR WORK, TAKE CARE OF THINGS AT HOME, OR GET ALONG WITH OTHER PEOPLE?: SOMEWHAT DIFFICULT
7. FEELING AFRAID AS IF SOMETHING AWFUL MIGHT HAPPEN: SEVERAL DAYS
IF YOU CHECKED OFF ANY PROBLEMS ON THIS QUESTIONNAIRE, HOW DIFFICULT HAVE THESE PROBLEMS MADE IT FOR YOU TO DO YOUR WORK, TAKE CARE OF THINGS AT HOME, OR GET ALONG WITH OTHER PEOPLE: SOMEWHAT DIFFICULT
3. WORRYING TOO MUCH ABOUT DIFFERENT THINGS: SEVERAL DAYS
7. FEELING AFRAID AS IF SOMETHING AWFUL MIGHT HAPPEN: SEVERAL DAYS
GAD7 TOTAL SCORE: 7
2. NOT BEING ABLE TO STOP OR CONTROL WORRYING: SEVERAL DAYS
GAD7 TOTAL SCORE: 7
1. FEELING NERVOUS, ANXIOUS, OR ON EDGE: SEVERAL DAYS
4. TROUBLE RELAXING: SEVERAL DAYS
GAD7 TOTAL SCORE: 7
6. BECOMING EASILY ANNOYED OR IRRITABLE: SEVERAL DAYS
5. BEING SO RESTLESS THAT IT IS HARD TO SIT STILL: SEVERAL DAYS

## 2023-05-09 NOTE — PROGRESS NOTES
Assessment & Plan     1.  Dizziness.  By history most consistent with acute labyrinthitis.  MRI of the brain on 5/7/2023 in ER negative.  Advised to see if over the next several days or week if the symptoms gradually improve.  Typically they improve over period of couple of weeks.  Could use over-the-counter meclizine.  2.  Palpitation.  Also started with the dizziness.  Question if it is related to anxiety.  TSH CBC BMP performed in the ER on 5/7/2023 were normal.  EKG looks normal.  For now I do not recommend any further evaluation but if the symptoms persist then a Holter monitor and echocardiogram the heart can be considered.  Discussed that with the patient.  3.  Elevated blood pressure without the diagnosis of hypertension.  It could be situational and related to the dizziness.  No added salt diet discussed.  Advised to monitor at home and return for follow-up in 2 to 3 weeks with Balbina Armstrong.  4.  Type 2 diabetes mellitus currently diet controlled with hemoglobin A1c 6.8 on 3/30/2023.  5.  Hypertriglyceridemia.  Value was above 500 so discussed starting fenofibrate to reduce chances of pancreatitis.  She will discuss it with her primary provider.  6.  Low HDL.  She appears to have metabolic syndrome.  She has some central obesity, low HDL, high triglyceride, insulin resistance state with diabetes and now elevated blood pressure.  7.  Class I obesity.  8.  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  Incidentally picked up.  Her primary can decide on the neuro interventional radiology consult.     MED REC REQUIRED  Post Medication Reconciliation Status:       Efrain Redman MD  [facility]    Elissa Canada is a 37 year old, presenting for the following health issues:  Hospital F/U (Hospital/Nursing Home/IP Rehab Facility: [facility] EMERGENCY/ URGENT CARE/Date of Admission: 5/7/2023/Date  of Discharge: 5/7/2023/Reason(s) for Admission: Dizziness, weakness, intracranial aneurysm, elevated blood pressure, sinus tachycardia//)        5/9/2023     2:22 PM   Additional Questions   Roomed by STEPHANIE   Accompanied by CHRISTIANO WOOTEN     [facility] Follow-up Visit:    Hospital/Nursing Home/IP Rehab Facility: [facility] EMERGENCY/ URGENT CARE  Date of Admission: 5/7/2023  Date of Discharge: 5/7/2023  Reason(s) for Admission: Dizziness, weakness, intracranial aneurysm, elevated blood pressure, sinus tachycardia  Was your hospitalization related to COVID-19? No   Problems taking medications regularly:  NO  Medication changes since discharge:NO  Problems adhering to non-medication therapy:  None    Summary of hospitalization:  See outside records, reviewed and scanned  Diagnostic Tests/Treatments reviewed.  Follow up needed: none  Other Healthcare Providers Involved in Patient s Care:         None  Update since discharge: stable.   Plan of care communicated with patient       Summary of hospitalization:  CareEverywhere information obtained and reviewed  Diagnostic Tests/Treatments reviewed.  Follow up needed: none  Other Healthcare Providers Involved in Patient s Care:         None  Update since discharge: stable.   Plan of care communicated with patient     37-year-old young lady with known history of type 2 diabetes, hypertriglyceridemia, low HDL went to the emergency room at [facility] on 5/7/2023 with symptoms of dizziness and palpitation.  Described the dizziness as feeling of off balance like being on a boat.  Perhaps even some lightheadedness.  No syncope or no chest pain.  She also felt fast heartbeat.  The work-up in the ER was negative.  She had MRI of the brain and also MRA of the head and neck which revealed an incidental finding of 2 mm left proximal cavernous internal carotid artery aneurysm.  EKG was felt to be normal.  CBC, BMP, blood sugar troponin I magnesium and TSH were normal.  She was  "asked to follow-up with primary care in few days.  Should denies nausea or vomiting.  No tinnitus or hearing loss.  No prior similar episodes.        Review of Systems   Constitutional, HEENT, cardiovascular, pulmonary, GI, , musculoskeletal, neuro, skin, endocrine and psych systems are negative, except as otherwise noted.      Objective    BP (!) 140/92 (BP Location: Right arm, Patient Position: Sitting, Cuff Size: Adult Large)   Pulse (!) 121   Temp 99.6  F (37.6  C) (Oral)   Resp 16   Ht 1.651 m (5' 5\")   Wt 87.4 kg (192 lb 11.2 oz)   LMP 04/20/2023 (Approximate)   SpO2 97%   Breastfeeding Unknown   BMI 32.07 kg/m    Body mass index is 32.07 kg/m .  Physical Exam   GENERAL: healthy, alert and no distress  NECK: no adenopathy, no asymmetry, masses, or scars and thyroid normal to palpation  RESP: lungs clear to auscultation - no rales, rhonchi or wheezes  CV: tachycardia, normal S1 S2, no S3 or S4, no murmur, click or rub, peripheral pulses strong and no peripheral edema  ABDOMEN: soft, nontender, no hepatosplenomegaly, no masses and bowel sounds normal  MS: no gross musculoskeletal defects noted, no edema                    "

## 2023-05-10 PROBLEM — E78.6 LOW HDL (UNDER 40): Status: ACTIVE | Noted: 2023-05-10

## 2023-05-10 PROBLEM — I67.1 CEREBRAL ANEURYSM, NONRUPTURED: Status: ACTIVE | Noted: 2023-05-07

## 2023-05-16 ENCOUNTER — OFFICE VISIT (OUTPATIENT)
Dept: FAMILY MEDICINE | Facility: CLINIC | Age: 38
End: 2023-05-16
Payer: COMMERCIAL

## 2023-05-16 VITALS
SYSTOLIC BLOOD PRESSURE: 139 MMHG | BODY MASS INDEX: 32.22 KG/M2 | WEIGHT: 193.6 LBS | TEMPERATURE: 99.3 F | DIASTOLIC BLOOD PRESSURE: 89 MMHG | HEART RATE: 110 BPM | OXYGEN SATURATION: 100 % | RESPIRATION RATE: 13 BRPM

## 2023-05-16 DIAGNOSIS — I10 ESSENTIAL HYPERTENSION WITH GOAL BLOOD PRESSURE LESS THAN 130/80: Primary | ICD-10-CM

## 2023-05-16 DIAGNOSIS — F41.8 SITUATIONAL ANXIETY: ICD-10-CM

## 2023-05-16 DIAGNOSIS — R00.2 HEART PALPITATIONS: ICD-10-CM

## 2023-05-16 DIAGNOSIS — E11.9 TYPE 2 DIABETES MELLITUS WITHOUT COMPLICATION, WITHOUT LONG-TERM CURRENT USE OF INSULIN (H): ICD-10-CM

## 2023-05-16 PROCEDURE — 99214 OFFICE O/P EST MOD 30 MIN: CPT | Performed by: NURSE PRACTITIONER

## 2023-05-16 RX ORDER — LISINOPRIL 5 MG/1
5 TABLET ORAL DAILY
Qty: 30 TABLET | Refills: 1 | Status: SHIPPED | OUTPATIENT
Start: 2023-05-16 | End: 2023-05-30

## 2023-05-16 ASSESSMENT — PAIN SCALES - GENERAL: PAINLEVEL: NO PAIN (0)

## 2023-05-16 ASSESSMENT — ANXIETY QUESTIONNAIRES: GAD7 TOTAL SCORE: 7
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
## 2023-05-16 NOTE — PATIENT INSTRUCTIONS
PLAN:   1.   Symptomatic therapy suggested: will start on lisinopril 5 mg a day.  Increase to 10 mg a day if BP continues in the 140s/ 90s   2.  Orders Placed This Encounter   Medications    lisinopril (ZESTRIL) 5 MG tablet     Sig: Take 1 tablet (5 mg) by mouth daily     Dispense:  30 tablet     Refill:  1     Orders Placed This Encounter   Procedures    REVIEW OF HEALTH MAINTENANCE PROTOCOL ORDERS    Adult Leadless EKG Monitor 3 to 7 Days       3. Patient needs to follow up in if no improvement,or sooner if worsening of symptoms or other symptoms develop.  FURTHER TESTING:       - Holter   Follow up in 1 month.  Initiated consultation with LG Barakat  for mental health counseling.       Preventive Health Recommendations  Female Ages 26 - 39  Yearly exam:   See your health care provider every year in order to  Review health changes.   Discuss preventive care.    Review your medicines if you your doctor has prescribed any.    Until age 30: Get a Pap test every three years (more often if you have had an abnormal result).    After age 30: Talk to your doctor about whether you should have a Pap test every 3 years or have a Pap test with HPV screening every 5 years.   You do not need a Pap test if your uterus was removed (hysterectomy) and you have not had cancer.  You should be tested each year for STDs (sexually transmitted diseases), if you're at risk.   Talk to your provider about how often to have your cholesterol checked.  If you are at risk for diabetes, you should have a diabetes test (fasting glucose).  Shots: Get a flu shot each year. Get a tetanus shot every 10 years.   Nutrition:   Eat at least 5 servings of fruits and vegetables each day.  Eat whole-grain bread, whole-wheat pasta and brown rice instead of white grains and rice.  Get adequate Calcium and Vitamin D.     Lifestyle  Exercise at least 150 minutes a week (30 minutes a day, 5 days of the week). This will help you control your weight and  prevent disease.  Limit alcohol to one drink per day.  No smoking.   Wear sunscreen to prevent skin cancer.  See your dentist every six months for an exam and cleaning.

## 2023-05-18 ENCOUNTER — TELEPHONE (OUTPATIENT)
Dept: BEHAVIORAL HEALTH | Facility: CLINIC | Age: 38
End: 2023-05-18
Payer: COMMERCIAL

## 2023-05-18 NOTE — TELEPHONE ENCOUNTER
Reached out to pt to offer [facility] appt per the request of Balbina Armstrong. 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 Provider will send her a "[employer] message with scheduling's number for when she is ready.

## 2023-05-22 ENCOUNTER — ANCILLARY PROCEDURE (OUTPATIENT)
Dept: CARDIOLOGY | Facility: CLINIC | Age: 38
End: 2023-05-22
Attending: NURSE PRACTITIONER
Payer: COMMERCIAL

## 2023-05-22 DIAGNOSIS — R00.2 HEART PALPITATIONS: ICD-10-CM

## 2023-05-22 PROCEDURE — 93244 EXT ECG>48HR<7D REV&INTERPJ: CPT | Performed by: INTERNAL MEDICINE

## 2023-05-22 PROCEDURE — 93242 EXT ECG>48HR<7D RECORDING: CPT | Performed by: INTERNAL MEDICINE

## 2023-05-22 NOTE — PATIENT INSTRUCTIONS
Patient has been prescribed a ZioPatch holter for 3 days.  Patient was instructed regarding the indication, function, care and prompt return of the ZioPatch holter monitor. The monitor, with S/N [id number],  was placed on the patient with instructions regarding care of the skin, electrodes, and monitor, as well as documentation in the patient diary. Patient demonstrated understanding of this information and agreed to call [employer] with further questions or concerns.

## 2023-05-23 ENCOUNTER — MYC MEDICAL ADVICE (OUTPATIENT)
Dept: FAMILY MEDICINE | Facility: CLINIC | Age: 38
End: 2023-05-23
Payer: COMMERCIAL

## 2023-05-23 DIAGNOSIS — I10 ESSENTIAL HYPERTENSION WITH GOAL BLOOD PRESSURE LESS THAN 130/80: ICD-10-CM

## 2023-05-30 RX ORDER — LISINOPRIL 10 MG/1
10 TABLET ORAL DAILY
Qty: 90 TABLET | Refills: 1 | Status: SHIPPED | OUTPATIENT
Start: 2023-05-30 | End: 2023-11-24

## 2023-05-30 NOTE — TELEPHONE ENCOUNTER
Routing to provider to review and advise.    Martha, RN  [facility] Primary Care Triage

## 2023-06-06 NOTE — RESULT ENCOUNTER NOTE
Jayshree Gonzalez,    Attached are your test results.  Your Holter shows rare early beats  Your symptoms are associated with normal heart rhythm which is reassuring    Please contact us if you have any questions.    Balbina Armstrong, CNP

## 2023-06-20 ENCOUNTER — TELEPHONE (OUTPATIENT)
Dept: NEUROSURGERY | Facility: CLINIC | Age: 38
End: 2023-06-20

## 2023-06-20 ENCOUNTER — ANCILLARY PROCEDURE (OUTPATIENT)
Dept: GENERAL RADIOLOGY | Facility: CLINIC | Age: 38
End: 2023-06-20
Attending: NURSE PRACTITIONER
Payer: COMMERCIAL

## 2023-06-20 ENCOUNTER — OFFICE VISIT (OUTPATIENT)
Dept: FAMILY MEDICINE | Facility: CLINIC | Age: 38
End: 2023-06-20
Payer: COMMERCIAL

## 2023-06-20 VITALS
BODY MASS INDEX: 31.06 KG/M2 | WEIGHT: 186.4 LBS | OXYGEN SATURATION: 98 % | HEART RATE: 105 BPM | RESPIRATION RATE: 21 BRPM | DIASTOLIC BLOOD PRESSURE: 88 MMHG | HEIGHT: 65 IN | SYSTOLIC BLOOD PRESSURE: 138 MMHG | TEMPERATURE: 98.6 F

## 2023-06-20 DIAGNOSIS — J18.9 PNEUMONIA DUE TO INFECTIOUS ORGANISM, UNSPECIFIED LATERALITY, UNSPECIFIED PART OF LUNG: ICD-10-CM

## 2023-06-20 DIAGNOSIS — I67.1 BRAIN ANEURYSM: ICD-10-CM

## 2023-06-20 DIAGNOSIS — E11.65 TYPE 2 DIABETES MELLITUS WITH HYPERGLYCEMIA, WITHOUT LONG-TERM CURRENT USE OF INSULIN (H): ICD-10-CM

## 2023-06-20 DIAGNOSIS — I10 ESSENTIAL HYPERTENSION WITH GOAL BLOOD PRESSURE LESS THAN 130/80: Primary | ICD-10-CM

## 2023-06-20 DIAGNOSIS — R07.81 PLEURITIC CHEST PAIN: ICD-10-CM

## 2023-06-20 LAB — RADIOLOGIST FLAGS: ABNORMAL

## 2023-06-20 PROCEDURE — 71046 X-RAY EXAM CHEST 2 VIEWS: CPT | Mod: TC | Performed by: FAMILY MEDICINE

## 2023-06-20 PROCEDURE — 99214 OFFICE O/P EST MOD 30 MIN: CPT | Performed by: NURSE PRACTITIONER

## 2023-06-20 ASSESSMENT — PAIN SCALES - GENERAL: PAINLEVEL: MODERATE PAIN (5)

## 2023-06-20 NOTE — PROGRESS NOTES
Elissa Canada is a 37 year old, presenting for the following health issues:  Hypertension        6/20/2023     2:59 PM   Additional Questions   Roomed by Sweetie     History of Present Illness       Hypertension: She presents for follow up of hypertension.  She does check blood pressure  regularly outside of the clinic. Outpatient blood pressures have not been over 140/90. She does not follow a low salt diet.     She eats 2-3 servings of fruits and vegetables daily.She consumes 0 sweetened beverage(s) daily.She exercises with enough effort to increase her heart rate 30 to 60 minutes per day.  She exercises with enough effort to increase her heart rate 4 days per week.   She is taking medications regularly.       Diabetes Follow-up       How often are you checking your blood sugar? Not at all    What concerns do you have today about your diabetes? None     Do you have any of these symptoms? (Select all that apply)  No numbness or tingling in feet.  No redness, sores or blisters on feet.  No complaints of excessive thirst.  No reports of blurry vision.  No significant changes to weight.    Have you had a diabetic eye exam in the last 12 months? No  Has been working on diet with her  who is diabetic and hopefully on recheck of labs will get back into the normal or prediabetes range   Her maternal grandmother was diabetic   Has some discomfort in right side of chest with a deep breath  Thinks is related to the air quality as that is what it seems to correlate with   Can not lie on either and that has started a week after being in the ER   No history of asthma    Very occasional cough   If lies down will feels like pressure or pounding in her chest but her BP at home is normal     BP Readings from Last 2 Encounters:   06/20/23 138/88   05/16/23 139/89     Hemoglobin A1C (%)   Date Value   03/30/2023 6.8 (H)   04/25/2022 6.3 (H)   01/03/2020 5.2   01/04/2019 5.8 (H)     LDL Cholesterol Calculated   Date Value    03/30/2023      Comment:     Cannot estimate LDL when triglyceride exceeds 400 mg/dL   04/25/2022      Comment:     Cannot estimate LDL when triglyceride exceeds 400 mg/dL   03/29/2021     Cannot estimate LDL when triglyceride exceeds 400 mg/dL mg/dL   01/03/2020     Cannot estimate LDL when triglyceride exceeds 400 mg/dL mg/dL     LDL Cholesterol Direct (mg/dL)   Date Value   03/30/2023 60   04/25/2022 68   03/29/2021 72   01/03/2020 122 (H)       Labs reviewed in EPIC  BP Readings from Last 3 Encounters:   06/20/23 138/88   05/16/23 139/89   05/09/23 (!) 140/92    Wt Readings from Last 3 Encounters:   06/20/23 84.6 kg (186 lb 6.4 oz)   05/16/23 87.8 kg (193 lb 9.6 oz)   05/09/23 87.4 kg (192 lb 11.2 oz)                  Patient Active Problem List   Diagnosis     History of abnormal cervical Pap smear     History of HPV infection     CARDIOVASCULAR SCREENING; LDL GOAL LESS THAN 160     Class 1 obesity due to excess calories without serious comorbidity with body mass index (BMI) of 32.0 to 32.9 in adult     Dysplasia of cervix, low grade (IVETT 1)     Hypertriglyceridemia     PCOS (polycystic ovarian syndrome)     Dyslipidemia     Cervical cancer screening     Obesity (BMI 30-39.9)     Diabetes mellitus, type 2 (H)     Low HDL (under 40)     Brain aneurysm     Past Surgical History:   Procedure Laterality Date     BIOPSY       COLPOSCOPY CERVIX, BIOPSY CERVIX, ENDOCERVICAL CURETTAGE, COMBINED  06/24/2008 2011       Social History     Tobacco Use     Smoking status: Never     Smokeless tobacco: Never   Vaping Use     Vaping status: Never Used   Substance Use Topics     Alcohol use: Yes     Comment: Rarely     Family History   Problem Relation Age of Onset     Hyperlipidemia Mother      Hypertension Mother      Hypertension Father      Hyperlipidemia Brother      Diabetes Maternal Grandmother      Ovarian Cancer Maternal Grandmother      Other Cancer Maternal Grandmother         Overian     Coronary Artery Disease  Maternal Grandfather      Hyperlipidemia Brother      Cerebrovascular Disease No family hx of      Thyroid Disease No family hx of      Breast Cancer No family hx of      Uterine Cancer No family hx of      Prostate Cancer No family hx of      Colorectal Cancer No family hx of      Pancreatic Cancer No family hx of      Lung Cancer No family hx of      Melanoma No family hx of      Autoimmune Disease No family hx of          Current Outpatient Medications   Medication Sig Dispense Refill     desogestrel-ethinyl estradiol (ISIBLOOM) 0.15-30 MG-MCG tablet Take 1 tablet by mouth daily 84 tablet 4     doxycycline hyclate (VIBRA-TABS) 100 MG tablet Take 1 tablet (100 mg) by mouth 2 times daily for 10 days 20 tablet 0     lisinopril (ZESTRIL) 10 MG tablet Take 1 tablet (10 mg) by mouth daily 90 tablet 1     Allergies   Allergen Reactions     Amoxicillin Anaphylaxis     Penicillins Anaphylaxis     Recent Labs   Lab Test 03/30/23  0904 04/25/22  0731 03/29/21  0913 01/03/20  0829   A1C 6.8* 6.3*  --  5.2   LDL 60 68 Cannot estimate LDL when triglyceride exceeds 400 mg/dL  72 Cannot estimate LDL when triglyceride exceeds 400 mg/dL  122*   HDL 27* 30* 32* 42*   TRIG 717* 403* 464* 403*   ALT 13 14 17  --    CR 0.76 0.76 0.83 0.74   GFRESTIMATED >90 >90 90 >90   GFRESTBLACK  --   --  >90 >90   POTASSIUM 4.4 4.2 4.2 4.3   TSH 2.88 3.88 3.28 3.44        Review of Systems   CONSTITUTIONAL:NEGATIVE for fever, chills, change in weight  INTEGUMENTARY/SKIN: NEGATIVE for worrisome rashes, moles or lesions  ENT/MOUTH: NEGATIVE for ear, mouth and throat problems  RESP:POSITIVE for cough-non productive and pleurisy and NEGATIVE for hemoptysis, SOB/dyspnea and sputum   CV: NEGATIVE for chest pain, palpitations or peripheral edema  GI: NEGATIVE for nausea, abdominal pain, heartburn, or change in bowel habits  MUSCULOSKELETAL: NEGATIVE for significant arthralgias or myalgia  NEURO: NEGATIVE for weakness, dizziness or  "paresthesias  ENDOCRINE: NEGATIVE for temperature intolerance, skin/hair changes  HEME/ALLERGY/IMMUNE: NEGATIVE for bleeding problems  PSYCHIATRIC: NEGATIVE for changes in mood or affect      Objective    /88 (BP Location: Right arm, Patient Position: Sitting, Cuff Size: Adult Large)   Pulse 105   Temp 98.6  F (37  C) (Oral)   Resp 21   Ht 1.653 m (5' 5.08\")   Wt 84.6 kg (186 lb 6.4 oz)   LMP 04/20/2023 (Approximate)   SpO2 98%   BMI 30.94 kg/m    Body mass index is 30.94 kg/m .  Physical Exam   GENERAL: Patient is well nourished, well developed,in no apparent distress  EYES: Eyes grossly normal to inspection and conjunctivae and sclerae normal  HENT:ear canals and TM's normal and nose and mouth without ulcers or lesions   NECK:normal and supple  CARDIAC:regular rates and rhythm, no murmur, click or rub and no irregular beats  RESP: normal respiratory rate and rhythm, lungs clear to auscultation  unlabored respirations, no intercostal retractions or accessory muscle use  ABD:soft, nontender  SKIN: Skin color, texture, turgor normal. No rashes or lesions.  MS: extremities normal- no gross deformities noted, gait normal and normal muscle tone  NEURO: mentation intact and speech normal  PSYCH: Alert, oriented, thought content appropriate,mentation appears normal., affect and mood normal    Results for orders placed or performed in visit on 06/20/23   XR Chest 2 Views     Status: Abnormal   Result Value Ref Range    Radiologist flags See follow-up recommendations above (Urgent)     Narrative    CHEST TWO VIEWS  6/20/2023 3:48 PM     HISTORY: Pleuritic chest pain.    COMPARISON: None.       Impression    IMPRESSION: Probable bilateral patchy infiltrates, some of which  appears somewhat rounded. Six-week follow-up chest radiograph  recommended to confirm resolution vs. CT scan for further evaluation.  No definite effusions. The cardiac silhouette is not enlarged.  Pulmonary vasculature is unremarkable. "     [Access Center: See follow-up recommendations above]    This report will be copied to the [facility] to ensure a  provider acknowledges the finding. [facility] is available Monday  through Friday 8am-3:30 pm.     PRINCESS UMANZOR MD         SYSTEM ID:  [id number]     Assessment & Plan     Essential hypertension with goal blood pressure less than 130/80  HTN Plan:  1)  Medication: continue current medication regimen unchanged  2)  Dietary sodium restriction  3)  Regular aerobic exercise  4)  Recheck in 6 months, sooner should new symptoms or   problems arise.  5) See todays orders.    Patient Education: Reviewed risks of hypertension and principles of   treatment.  - Basic metabolic panel    Type 2 diabetes mellitus with hyperglycemia, without long-term current use of insulin (H)  Will follow up and/or notify patient of  results via My Chart to determine further need for followup  - Basic metabolic panel  - Hemoglobin A1c    Brain aneurysm 2 mm left proximal ICA   - Adult Neurology  Referral    Pleuritic chest pain  - XR Chest 2 Views    Pneumonia due to infectious organism, unspecified laterality, unspecified part of lung  Discussed implications of pneumonia and signs/symptoms of respiratory distress, dehydration and sepsis.  If any of these occur, patient will seek care immediately.  Will Start:  - doxycycline hyclate (VIBRA-TABS) 100 MG tablet  Dispense: 20 tablet; Refill: 0  - XR Chest 2 Views      Ordering of each unique test  Prescription drug management   Time spent by me doing chart review, history and exam, documentation and further activities per the note       See Patient Instructions  Patient Instructions     PLAN:   1.   Symptomatic therapy suggested: Continue current medications as prescribed.  2.  Orders Placed This Encounter   Procedures     XR Chest 2 Views     Basic metabolic panel     Hemoglobin A1c     Adult Neurology  Referral     3. Patient needs to follow up in  if no improvement,or sooner if worsening of symptoms or other symptoms develop.  FUTURE LABS:       - Schedule a non-fasting blood draw within a month  Work on weight loss  Regular exercise            EMANI Olivas CNP [facility]

## 2023-06-20 NOTE — TELEPHONE ENCOUNTER
Writer routed to Neurosurgery Clinic Scheduling     Referral     Jaelyn Crockett LPN  Neurosurgery

## 2023-06-20 NOTE — PATIENT INSTRUCTIONS
PLAN:   1.   Symptomatic therapy suggested: Continue current medications as prescribed.  2.  Orders Placed This Encounter   Procedures    XR Chest 2 Views    Basic metabolic panel    Hemoglobin A1c    Adult Neurology  Referral     3. Patient needs to follow up in if no improvement,or sooner if worsening of symptoms or other symptoms develop.  FUTURE LABS:       - Schedule a non-fasting blood draw within a month  Work on weight loss  Regular exercise

## 2023-06-20 NOTE — TELEPHONE ENCOUNTER
[facility] Call Center    Phone Message    May a detailed message be left on voicemail: yes     Reason for Call: Appointment Intake    Referring Provider Name: Balbina Armstrong APRN CNP  Diagnosis and/or Symptoms: Brain aneurysm [I67.1]    Please assist with scheduling, per protocol send to Neurosurgery    Action Taken: Message routed to:  [facility] ([facility]): [facility] Neurosurgery    Travel Screening: Not Applicable

## 2023-06-21 RX ORDER — DOXYCYCLINE HYCLATE 100 MG
100 TABLET ORAL 2 TIMES DAILY
Qty: 20 TABLET | Refills: 0 | Status: SHIPPED | OUTPATIENT
Start: 2023-06-21 | End: 2023-07-01

## 2023-06-21 NOTE — RESULT ENCOUNTER NOTE
Jayshree Gonzalez,    Attached are your test results.  Your chest xray shows infiltrates suspicious for pneumonia   Am going to send antibiotics and also have you recheck the xray in 4 weeks   I will place order. Please call [phone] to schedule.   Please contact us if you have any questions.    Balbina Armstrong, CNP

## 2023-06-27 NOTE — TELEPHONE ENCOUNTER
Records Requested     June 27, 2023 8:54 AM   [id number]   Facility  [facility]   Outcome 8:57am Sent request for imaging to be pushed to PACS. -LATRICE Michelle on 7/18/2023 at 8:10 AM Imaging resolved into PACS. -LATRICE      RECORDS RECEIVED FROM: Care Everywhere   REASON FOR VISIT: Brain aneurysm   Date of Appt: 7/24/23   NOTES (FOR ALL VISITS) STATUS DETAILS   OFFICE NOTE from referring provider Internal 6/20/23, 5/16/23, 3/30/23 Balbina Armstrong APRN CNP @[facility]     OFFICE NOTE from other specialist Internal 5/9/23 Efrain Redman MD @[facility]     DISCHARGE REPORT from the ER Care Everywhere 5/7/23 Milton Nguyen PA  @[facility] ED     MEDICATION LIST Internal    IMAGING  (FOR ALL VISITS)     MRI (HEAD, NECK, SPINE) PACS  [facility]  5/7/23 MR Soft Tissue Neck &  MR Head Brain/MR Angio Head Neck

## 2023-07-07 ENCOUNTER — MYC MEDICAL ADVICE (OUTPATIENT)
Dept: FAMILY MEDICINE | Facility: CLINIC | Age: 38
End: 2023-07-07
Payer: COMMERCIAL

## 2023-07-09 ENCOUNTER — HEALTH MAINTENANCE LETTER (OUTPATIENT)
Age: 38
End: 2023-07-09

## 2023-07-21 ENCOUNTER — LAB (OUTPATIENT)
Dept: LAB | Facility: CLINIC | Age: 38
End: 2023-07-21
Payer: COMMERCIAL

## 2023-07-21 ENCOUNTER — ANCILLARY PROCEDURE (OUTPATIENT)
Dept: GENERAL RADIOLOGY | Facility: CLINIC | Age: 38
End: 2023-07-21
Attending: NURSE PRACTITIONER
Payer: COMMERCIAL

## 2023-07-21 DIAGNOSIS — J18.9 PNEUMONIA DUE TO INFECTIOUS ORGANISM, UNSPECIFIED LATERALITY, UNSPECIFIED PART OF LUNG: ICD-10-CM

## 2023-07-21 DIAGNOSIS — I10 ESSENTIAL HYPERTENSION WITH GOAL BLOOD PRESSURE LESS THAN 130/80: ICD-10-CM

## 2023-07-21 DIAGNOSIS — E11.65 TYPE 2 DIABETES MELLITUS WITH HYPERGLYCEMIA, WITHOUT LONG-TERM CURRENT USE OF INSULIN (H): ICD-10-CM

## 2023-07-21 LAB
ANION GAP SERPL CALCULATED.3IONS-SCNC: 12 MMOL/L (ref 7–15)
BUN SERPL-MCNC: 13.7 MG/DL (ref 6–20)
CALCIUM SERPL-MCNC: 9.5 MG/DL (ref 8.6–10)
CHLORIDE SERPL-SCNC: 101 MMOL/L (ref 98–107)
CREAT SERPL-MCNC: 0.99 MG/DL (ref 0.51–0.95)
DEPRECATED HCO3 PLAS-SCNC: 21 MMOL/L (ref 22–29)
GFR SERPL CREATININE-BSD FRML MDRD: 74 ML/MIN/1.73M2
GLUCOSE SERPL-MCNC: 151 MG/DL (ref 70–99)
HBA1C MFR BLD: 6.1 % (ref 0–5.6)
POTASSIUM SERPL-SCNC: 4.2 MMOL/L (ref 3.4–5.3)
SODIUM SERPL-SCNC: 134 MMOL/L (ref 136–145)

## 2023-07-21 PROCEDURE — 80048 BASIC METABOLIC PNL TOTAL CA: CPT

## 2023-07-21 PROCEDURE — 71046 X-RAY EXAM CHEST 2 VIEWS: CPT | Mod: GC | Performed by: RADIOLOGY

## 2023-07-21 PROCEDURE — 83036 HEMOGLOBIN GLYCOSYLATED A1C: CPT

## 2023-07-21 PROCEDURE — 36415 COLL VENOUS BLD VENIPUNCTURE: CPT

## 2023-07-23 DIAGNOSIS — R93.89 ABNORMAL CHEST X-RAY: ICD-10-CM

## 2023-07-23 DIAGNOSIS — J18.9 PNEUMONIA DUE TO INFECTIOUS ORGANISM, UNSPECIFIED LATERALITY, UNSPECIFIED PART OF LUNG: Primary | ICD-10-CM

## 2023-07-23 DIAGNOSIS — R91.8 OPACITY OF LUNG ON IMAGING STUDY: ICD-10-CM

## 2023-07-23 DIAGNOSIS — E04.1 THYROID NODULE: ICD-10-CM

## 2023-07-23 DIAGNOSIS — E11.65 TYPE 2 DIABETES MELLITUS WITH HYPERGLYCEMIA, WITHOUT LONG-TERM CURRENT USE OF INSULIN (H): Primary | ICD-10-CM

## 2023-07-23 DIAGNOSIS — R93.89 ABNORMAL CT OF THE CHEST: ICD-10-CM

## 2023-07-24 ENCOUNTER — VIRTUAL VISIT (OUTPATIENT)
Dept: NEUROSURGERY | Facility: CLINIC | Age: 38
End: 2023-07-24
Attending: NURSE PRACTITIONER
Payer: COMMERCIAL

## 2023-07-24 ENCOUNTER — PRE VISIT (OUTPATIENT)
Dept: NEUROSURGERY | Facility: CLINIC | Age: 38
End: 2023-07-24

## 2023-07-24 ENCOUNTER — MYC MEDICAL ADVICE (OUTPATIENT)
Dept: FAMILY MEDICINE | Facility: CLINIC | Age: 38
End: 2023-07-24

## 2023-07-24 VITALS — HEIGHT: 64 IN | WEIGHT: 175 LBS | BODY MASS INDEX: 29.88 KG/M2

## 2023-07-24 DIAGNOSIS — I67.1 BRAIN ANEURYSM: ICD-10-CM

## 2023-07-24 PROCEDURE — 99202 OFFICE O/P NEW SF 15 MIN: CPT | Mod: 95 | Performed by: NURSE PRACTITIONER

## 2023-07-24 ASSESSMENT — PAIN SCALES - GENERAL: PAINLEVEL: NO PAIN (0)

## 2023-07-24 NOTE — NURSING NOTE
Is the patient currently in the state of [place]? YES    Visit mode:VIDEO    If the visit is dropped, the patient can be reconnected by: VIDEO VISIT: Text to cell phone: [phone]    Will anyone else be joining the visit? NO      How would you like to obtain your AVS? MyChart    Are changes needed to the allergy or medication list? NO    Reason for visit: Consult

## 2023-07-24 NOTE — PROGRESS NOTES
Virtual Visit Details    Type of service:  Video Visit     Originating Location (pt. Location): Home    Distant Location (provider location):  Off-site  Platform used for Video Visit: [facility]  Department of Neurosurgery      Name: Nancie Gonzalez  MRN: [id number]  Age: 37 year old  : 1985  Referring provider: Balbina Armstrong  2023      Chief Complaint:   Left ICA cavernous aneurysm, unruptured  New patient    History of Present Illness:   Nancie Gonzalez is a 37 year old female with a history of hypertension who is here today for unruptured left ICA cavernous aneurysm.  This was incidentally found on imaging in May 2023 when she presented to the ER with dizziness and limb heaviness.    Today I had a video visit with the patient.  She denies any known family history of aneurysm.  She is a non-smoker.  She has hypertension and is currently on lisinopril.      Review of Systems:   Pertinent items are noted in HPI or as in patient entered ROS below, remainder of complete ROS is negative.        No data to display                 Active Medications:     Current Outpatient Medications:      desogestrel-ethinyl estradiol (ISIBLOOM) 0.15-30 MG-MCG tablet, Take 1 tablet by mouth daily, Disp: 84 tablet, Rfl: 4     lisinopril (ZESTRIL) 10 MG tablet, Take 1 tablet (10 mg) by mouth daily, Disp: 90 tablet, Rfl: 1      Allergies:   Amoxicillin and Penicillins      Past Medical History:  Past Medical History:   Diagnosis Date     Cerebral aneurysm, nonruptured 2023     IVETT I (cervical intraepithelial neoplasia I)     Pt reported     Diabetes mellitus, type 2 (H) 2023     Low HDL (under 40) 5/10/2023     Patient Active Problem List   Diagnosis     History of abnormal cervical Pap smear     History of HPV infection     CARDIOVASCULAR SCREENING; LDL GOAL LESS THAN 160     Class 1 obesity due to excess calories without serious comorbidity with body mass index (BMI) of 32.0 to 32.9 in  "adult     Dysplasia of cervix, low grade (IVETT 1)     Hypertriglyceridemia     PCOS (polycystic ovarian syndrome)     Dyslipidemia     Cervical cancer screening     Obesity (BMI 30-39.9)     Diabetes mellitus, type 2 (H)     Low HDL (under 40)     Brain aneurysm        Past Surgical History:  Past Surgical History:   Procedure Laterality Date     BIOPSY       COLPOSCOPY CERVIX, BIOPSY CERVIX, ENDOCERVICAL CURETTAGE, COMBINED  2008       Family History:   Family History   Problem Relation Age of Onset     Hyperlipidemia Mother      Hypertension Mother      Hypertension Father      Hyperlipidemia Brother      Diabetes Maternal Grandmother      Ovarian Cancer Maternal Grandmother      Other Cancer Maternal Grandmother         Overian     Coronary Artery Disease Maternal Grandfather      Hyperlipidemia Brother      Cerebrovascular Disease No family hx of      Thyroid Disease No family hx of      Breast Cancer No family hx of      Uterine Cancer No family hx of      Prostate Cancer No family hx of      Colorectal Cancer No family hx of      Pancreatic Cancer No family hx of      Lung Cancer No family hx of      Melanoma No family hx of      Autoimmune Disease No family hx of          Social History:   Social History     Tobacco Use     Smoking status: Never     Smokeless tobacco: Never   Vaping Use     Vaping Use: Never used   Substance Use Topics     Alcohol use: Yes     Comment: Rarely     Drug use: No        Physical Exam:   Ht 1.626 m (5' 4\")   Wt 79.4 kg (175 lb)   BMI 30.04 kg/m     General: No acute distress.   Neuro: The patient is fully oriented. Speech is normal.   Psych: Normal mood and affect. Behavior is normal.      Imagin2023 MRA brain:  MRA Head:   1. No proximal arterial occlusion, high-grade stenosis, dissection, or vascular malformation.   2. A 2 mm protrusion from the lateral aspect of the left proximal cavernous ICA segments consenting for aneurysm. Outpatient consultation with " the neurointerventional radiology service can be arranged by calling [phone].     Assessment:  Left cavernous aneurysm, unruptured  New patient    Plan: Discussed and images reviewed with Dr. Valdez who recommended a follow-up MRA in 2 years from now.  Patient will be contacted next year to schedule this follow-up.  She is comfortable with this plan.      Kassie Tuttle CNP  Department of Neurosurgery    I spent 15 minutes on patient care activities related to this encounter on the date of service, including time spent reviewing the chart, obtaining history and examination and in counseling the patient, and in documentation in the electronic medical record.

## 2023-07-24 NOTE — LETTER
2023       RE: Nancie Gonzalez  [address]  [facility] [address]       Dear Colleague,    Thank you for referring your patient, Nancie Gonzalez, to the [facility] NEUROSURGERY [facility]IC [place] [facility]. Please see a copy of my visit note below.    [facility]  Department of Neurosurgery      Name: Nancie Gonzalez  MRN: [id number]  Age: 37 year old  : 1985  Referring provider: Balbina Armstrong  2023      Chief Complaint:   Left ICA cavernous aneurysm, unruptured  New patient    History of Present Illness:   Nancie Gonzalez is a 37 year old female with a history of hypertension who is here today for unruptured left ICA cavernous aneurysm.  This was incidentally found on imaging in May 2023 when she presented to the ER with dizziness and limb heaviness.    Today I had a video visit with the patient.  She denies any known family history of aneurysm.  She is a non-smoker.  She has hypertension and is currently on lisinopril.      Review of Systems:   Pertinent items are noted in HPI or as in patient entered ROS below, remainder of complete ROS is negative.        No data to display                 Active Medications:     Current Outpatient Medications:     desogestrel-ethinyl estradiol (ISIBLOOM) 0.15-30 MG-MCG tablet, Take 1 tablet by mouth daily, Disp: 84 tablet, Rfl: 4    lisinopril (ZESTRIL) 10 MG tablet, Take 1 tablet (10 mg) by mouth daily, Disp: 90 tablet, Rfl: 1      Allergies:   Amoxicillin and Penicillins      Past Medical History:  Past Medical History:   Diagnosis Date    Cerebral aneurysm, nonruptured 2023    IVETT I (cervical intraepithelial neoplasia I)     Pt reported    Diabetes mellitus, type 2 (H) 2023    Low HDL (under 40) 5/10/2023     Patient Active Problem List   Diagnosis    History of abnormal cervical Pap smear    History of HPV infection    CARDIOVASCULAR SCREENING; LDL GOAL LESS THAN 160     "Class 1 obesity due to excess calories without serious comorbidity with body mass index (BMI) of 32.0 to 32.9 in adult    Dysplasia of cervix, low grade (IVETT 1)    Hypertriglyceridemia    PCOS (polycystic ovarian syndrome)    Dyslipidemia    Cervical cancer screening    Obesity (BMI 30-39.9)    Diabetes mellitus, type 2 (H)    Low HDL (under 40)    Brain aneurysm        Past Surgical History:  Past Surgical History:   Procedure Laterality Date    BIOPSY      COLPOSCOPY CERVIX, BIOPSY CERVIX, ENDOCERVICAL CURETTAGE, COMBINED  2008       Family History:   Family History   Problem Relation Age of Onset    Hyperlipidemia Mother     Hypertension Mother     Hypertension Father     Hyperlipidemia Brother     Diabetes Maternal Grandmother     Ovarian Cancer Maternal Grandmother     Other Cancer Maternal Grandmother         Overian    Coronary Artery Disease Maternal Grandfather     Hyperlipidemia Brother     Cerebrovascular Disease No family hx of     Thyroid Disease No family hx of     Breast Cancer No family hx of     Uterine Cancer No family hx of     Prostate Cancer No family hx of     Colorectal Cancer No family hx of     Pancreatic Cancer No family hx of     Lung Cancer No family hx of     Melanoma No family hx of     Autoimmune Disease No family hx of          Social History:   Social History     Tobacco Use    Smoking status: Never    Smokeless tobacco: Never   Vaping Use    Vaping Use: Never used   Substance Use Topics    Alcohol use: Yes     Comment: Rarely    Drug use: No        Physical Exam:   Ht 1.626 m (5' 4\")   Wt 79.4 kg (175 lb)   BMI 30.04 kg/m     General: No acute distress.   Neuro: The patient is fully oriented. Speech is normal.   Psych: Normal mood and affect. Behavior is normal.      Imagin2023 MRA brain:  MRA Head:   1. No proximal arterial occlusion, high-grade stenosis, dissection, or vascular malformation.   2. A 2 mm protrusion from the lateral aspect of the left " proximal cavernous ICA segments consenting for aneurysm. Outpatient consultation with the neurointerventional radiology service can be arranged by calling [phone].     Assessment:  Left cavernous aneurysm, unruptured  New patient    Plan: Discussed and images reviewed with Dr. Valdez who recommended a follow-up MRA in 2 years from now.  Patient will be contacted next year to schedule this follow-up.  She is comfortable with this plan.      I spent 15 minutes on patient care activities related to this encounter on the date of service, including time spent reviewing the chart, obtaining history and examination and in counseling the patient, and in documentation in the electronic medical record.        Again, thank you for allowing me to participate in the care of your patient.      Sincerely,    EMANI Galeas CNP

## 2023-07-24 NOTE — RESULT ENCOUNTER NOTE
Jayshree Gonzalez,    Attached are your test results.  -Kidney function (GFR) is normal.  -Sodium is decreased.  ADVISE: rechecking this in 3 month.  -Potassium is normal.  -Calcium is normal.  -Glucose is elevated due to your diabetes.  -A1C (test of diabetes control the last 2-3 months) is at your goal. Please recheck your A1C test in 3 months.    Please contact us if you have any questions.    Balbina Armstrong, CNP

## 2023-07-24 NOTE — RESULT ENCOUNTER NOTE
Jayshree Gonzalez,    Attached are your test results.  Your chest xray still shows a patchy area.   We need to do a CT of your chest to examine this closer   I will place order. Please call [phone] to schedule.   Please contact us if you have any questions.    Balbina Armstrong, CNP

## 2023-07-25 ENCOUNTER — ANCILLARY PROCEDURE (OUTPATIENT)
Dept: CT IMAGING | Facility: CLINIC | Age: 38
End: 2023-07-25
Attending: NURSE PRACTITIONER
Payer: COMMERCIAL

## 2023-07-25 DIAGNOSIS — R93.89 ABNORMAL CHEST X-RAY: ICD-10-CM

## 2023-07-25 DIAGNOSIS — J18.9 PNEUMONIA DUE TO INFECTIOUS ORGANISM, UNSPECIFIED LATERALITY, UNSPECIFIED PART OF LUNG: ICD-10-CM

## 2023-07-25 PROCEDURE — 71250 CT THORAX DX C-: CPT | Mod: GC | Performed by: RADIOLOGY

## 2023-07-26 NOTE — RESULT ENCOUNTER NOTE
Jayshree Gonzalez,    Attached are your test results.  Your CT scan persistent patches in your chest. I am going to refer you to pulmonology for further evaluation   Please be aware that coverage of these services is subject to the terms and limitations of your health insurance plan.  Call member services at your health plan with any benefit or coverage questions.  [facility] will call you to coordinate your care as prescribed by the provider.  If you don't hear from a representative within 2 business days, please call [phone].      Also mentions your thyroid so will re ultrasound to determine stability   I will place order. Please call [phone] to schedule.     Please contact us if you have any questions.    Balbina Armstrong, CNP

## 2023-07-27 ENCOUNTER — ANCILLARY PROCEDURE (OUTPATIENT)
Dept: ULTRASOUND IMAGING | Facility: CLINIC | Age: 38
End: 2023-07-27
Attending: NURSE PRACTITIONER
Payer: COMMERCIAL

## 2023-07-27 DIAGNOSIS — E04.1 THYROID NODULE: ICD-10-CM

## 2023-07-27 PROCEDURE — 76536 US EXAM OF HEAD AND NECK: CPT

## 2023-07-27 NOTE — RESULT ENCOUNTER NOTE
Jayshree Gonzalez,    Attached are your test results.  Your thyroid is stable which is reassuring    Please contact us if you have any questions.    Balbina Armstrong, CNP

## 2023-08-02 ENCOUNTER — TELEPHONE (OUTPATIENT)
Dept: FAMILY MEDICINE | Facility: CLINIC | Age: 38
End: 2023-08-02
Payer: COMMERCIAL

## 2023-08-02 NOTE — TELEPHONE ENCOUNTER
Forms/Letter Request    Type of form/letter:  [facility]     Have you been seen for this request: N/A    Do we have the form/letter: Yes: Will place form on providers desk for review/signature    When is form/letter needed by: asap    How would you like the form/letter returned: Fax : [phone]

## 2023-09-12 DIAGNOSIS — R93.89 ABNORMAL CT OF THE CHEST: Primary | ICD-10-CM

## 2023-09-23 ENCOUNTER — TRANSFERRED RECORDS (OUTPATIENT)
Dept: MULTI SPECIALTY CLINIC | Facility: CLINIC | Age: 38
End: 2023-09-23

## 2023-09-23 LAB — RETINOPATHY: NORMAL

## 2023-10-16 ENCOUNTER — OFFICE VISIT (OUTPATIENT)
Dept: NURSING | Facility: CLINIC | Age: 38
End: 2023-10-16
Payer: COMMERCIAL

## 2023-10-16 ENCOUNTER — ANCILLARY PROCEDURE (OUTPATIENT)
Dept: CT IMAGING | Facility: CLINIC | Age: 38
End: 2023-10-16
Attending: INTERNAL MEDICINE
Payer: COMMERCIAL

## 2023-10-16 ENCOUNTER — OFFICE VISIT (OUTPATIENT)
Dept: PULMONOLOGY | Facility: CLINIC | Age: 38
End: 2023-10-16
Payer: COMMERCIAL

## 2023-10-16 VITALS
WEIGHT: 172 LBS | OXYGEN SATURATION: 98 % | BODY MASS INDEX: 29.52 KG/M2 | SYSTOLIC BLOOD PRESSURE: 125 MMHG | DIASTOLIC BLOOD PRESSURE: 79 MMHG | HEART RATE: 90 BPM

## 2023-10-16 VITALS — HEART RATE: 105 BPM | WEIGHT: 172.5 LBS | OXYGEN SATURATION: 100 % | BODY MASS INDEX: 29.61 KG/M2

## 2023-10-16 DIAGNOSIS — R91.8 OPACITY OF LUNG ON IMAGING STUDY: ICD-10-CM

## 2023-10-16 DIAGNOSIS — R93.89 ABNORMAL CT OF THE CHEST: ICD-10-CM

## 2023-10-16 DIAGNOSIS — R93.89 ABNORMAL CT OF THE CHEST: Primary | ICD-10-CM

## 2023-10-16 LAB
CREAT BLD-MCNC: 0.8 MG/DL (ref 0.5–1)
EGFRCR SERPLBLD CKD-EPI 2021: >60 ML/MIN/1.73M2

## 2023-10-16 PROCEDURE — 94729 DIFFUSING CAPACITY: CPT | Performed by: INTERNAL MEDICINE

## 2023-10-16 PROCEDURE — 86481 TB AG RESPONSE T-CELL SUSP: CPT | Performed by: INTERNAL MEDICINE

## 2023-10-16 PROCEDURE — 86036 ANCA SCREEN EACH ANTIBODY: CPT | Performed by: INTERNAL MEDICINE

## 2023-10-16 PROCEDURE — 99000 SPECIMEN HANDLING OFFICE-LAB: CPT | Performed by: INTERNAL MEDICINE

## 2023-10-16 PROCEDURE — 94726 PLETHYSMOGRAPHY LUNG VOLUMES: CPT | Performed by: INTERNAL MEDICINE

## 2023-10-16 PROCEDURE — 99205 OFFICE O/P NEW HI 60 MIN: CPT | Mod: 25 | Performed by: INTERNAL MEDICINE

## 2023-10-16 PROCEDURE — 87305 ASPERGILLUS AG IA: CPT | Mod: 90 | Performed by: INTERNAL MEDICINE

## 2023-10-16 PROCEDURE — 82565 ASSAY OF CREATININE: CPT

## 2023-10-16 PROCEDURE — 36415 COLL VENOUS BLD VENIPUNCTURE: CPT | Performed by: INTERNAL MEDICINE

## 2023-10-16 PROCEDURE — 86635 COCCIDIOIDES ANTIBODY: CPT | Mod: 90 | Performed by: INTERNAL MEDICINE

## 2023-10-16 PROCEDURE — 74177 CT ABD & PELVIS W/CONTRAST: CPT | Mod: GC | Performed by: RADIOLOGY

## 2023-10-16 PROCEDURE — 86037 ANCA TITER EACH ANTIBODY: CPT | Performed by: INTERNAL MEDICINE

## 2023-10-16 PROCEDURE — 94375 RESPIRATORY FLOW VOLUME LOOP: CPT | Performed by: INTERNAL MEDICINE

## 2023-10-16 PROCEDURE — 87385 HISTOPLASMA CAPSUL AG IA: CPT | Mod: 90 | Performed by: INTERNAL MEDICINE

## 2023-10-16 PROCEDURE — 87449 NOS EACH ORGANISM AG IA: CPT | Mod: 90 | Performed by: INTERNAL MEDICINE

## 2023-10-16 PROCEDURE — 71275 CT ANGIOGRAPHY CHEST: CPT | Mod: GC | Performed by: RADIOLOGY

## 2023-10-16 PROCEDURE — 86038 ANTINUCLEAR ANTIBODIES: CPT | Performed by: INTERNAL MEDICINE

## 2023-10-16 PROCEDURE — 86606 ASPERGILLUS ANTIBODY: CPT | Mod: 90 | Performed by: INTERNAL MEDICINE

## 2023-10-16 PROCEDURE — 86612 BLASTOMYCES ANTIBODY: CPT | Mod: 90 | Performed by: INTERNAL MEDICINE

## 2023-10-16 PROCEDURE — 86698 HISTOPLASMA ANTIBODY: CPT | Mod: 90 | Performed by: INTERNAL MEDICINE

## 2023-10-16 RX ORDER — IOPAMIDOL 755 MG/ML
67 INJECTION, SOLUTION INTRAVASCULAR ONCE
Status: COMPLETED | OUTPATIENT
Start: 2023-10-16 | End: 2023-10-16

## 2023-10-16 RX ADMIN — IOPAMIDOL 67 ML: 755 INJECTION, SOLUTION INTRAVASCULAR at 14:58

## 2023-10-16 ASSESSMENT — PAIN SCALES - GENERAL: PAINLEVEL: NO PAIN (0)

## 2023-10-16 NOTE — PROGRESS NOTES
Pulmonary Clinic New Patient Consult  Reason for Consult: Abnormal CT Chest  History of Present Illness  I had the pleasure of seeing Nancie Gonzalez who is a 37 yr old female with a history of DM who presents for an evaluation of an abnormal CT Chest.  To briefly review, her issues started when she was seen at the ED in 5/2023 for complaints of SOB, palpitations and dizziness. She was worked up for stroke at that time with MRI brain which was negative and she was thought to have labyrinthitis. She also noticed that she was having episodic chest pain usually pleuritic. She was also found to be hypertensive at that time.  She denies any recurrent miscarriages, no personal or family history of blood clots. Denies any hemoptysis, no vaginal symptoms and denies any abdominal bloating nor pain/swelling. No joint pains, mouth or oral lesions, no nasal lesions. Has been using oral contraceptives (estrogen) for several years but denies any recent plane travel or long road trips. Had COVID symptoms in 3/2023 with very mild transient symptoms and did not require any medications nor hospitalizations.   She denied any wheezing, cough nor chest tightness and has no history of asthma.  Long time non smoker. Grandmother was diagnosed with ovarian cancer. 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 No use of hot tubs and does not keep any exotic pets such as birds.    Review of Systems:  10 of 14 systems reviewed and are negative unless otherwise stated in HPI.    Past Medical History:   Diagnosis Date    Cerebral aneurysm, nonruptured 5/7/2023    IVETT I (cervical intraepithelial neoplasia I)     Pt reported    Diabetes mellitus, type 2 (H) 5/9/2023    Low HDL (under 40) 5/10/2023       Past Surgical History:   Procedure Laterality Date    BIOPSY      COLPOSCOPY CERVIX, BIOPSY CERVIX, ENDOCERVICAL CURETTAGE, COMBINED  06/24/2008 2011       Family History   Problem Relation Age of Onset    Hyperlipidemia Mother     Hypertension Mother      Hypertension Father     Hyperlipidemia Brother     Diabetes Maternal Grandmother     Ovarian Cancer Maternal Grandmother     Other Cancer Maternal Grandmother         Overian    Coronary Artery Disease Maternal Grandfather     Hyperlipidemia Brother     Cerebrovascular Disease No family hx of     Thyroid Disease No family hx of     Breast Cancer No family hx of     Uterine Cancer No family hx of     Prostate Cancer No family hx of     Colorectal Cancer No family hx of     Pancreatic Cancer No family hx of     Lung Cancer No family hx of     Melanoma No family hx of     Autoimmune Disease No family hx of        Social History     Socioeconomic History    Marital status:    Tobacco Use    Smoking status: Never    Smokeless tobacco: Never   Vaping Use    Vaping Use: Never used   Substance and Sexual Activity    Alcohol use: Yes     Comment: Rarely    Drug use: No    Sexual activity: Yes     Partners: Male     Birth control/protection: Pill   Other Topics Concern    Parent/sibling w/ CABG, MI or angioplasty before 65F 55M? No         Allergies   Allergen Reactions    Amoxicillin Anaphylaxis    Penicillins Anaphylaxis         Current Outpatient Medications:     desogestrel-ethinyl estradiol (ISIBLOOM) 0.15-30 MG-MCG tablet, Take 1 tablet by mouth daily, Disp: 84 tablet, Rfl: 4    lisinopril (ZESTRIL) 10 MG tablet, Take 1 tablet (10 mg) by mouth daily, Disp: 90 tablet, Rfl: 1      Physical Exam:  /79 (BP Location: Left arm, Patient Position: Sitting, Cuff Size: Adult Large)   Pulse 90   Wt 78 kg (172 lb)   SpO2 98%   BMI 29.52 kg/m    GENERAL: Well developed, well nourished, alert, and in no apparent distress.  HEENT: Normocephalic, atraumatic. PERRL, EOMI. Oral mucosa is moist. No perioral cyanosis.  NECK: supple, no masses, no thyromegaly.  RESP:  Normal respiratory effort.  CTAB.  No rales, wheezes, rhonchi.  No cyanosis or clubbing.  CV: Normal S1, S2, regular rhythm, normal rate. No murmur.  No  "LE edema.   ABDOMEN:  Soft, non-tender, non-distended.   SKIN: warm and dry. No rash.  NEURO: AAOx3.  Normal gait.  Fluent speech.  PSYCH: mentation appears normal.     Results:  PFTs: Reviewed and discussed with patient- normal spirometry with normal diffusion  Most Recent [facility] Pulmonary Function Testing    FVC-Pred   Date Value Ref Range Status   10/16/2023 3.45 L      FVC-Pre   Date Value Ref Range Status   10/16/2023 3.60 L      FVC-%Pred-Pre   Date Value Ref Range Status   10/16/2023 104 %      FEV1-Pre   Date Value Ref Range Status   10/16/2023 2.90 L      FEV1-%Pred-Pre   Date Value Ref Range Status   10/16/2023 101 %      FEV1FVC-Pred   Date Value Ref Range Status   10/16/2023 83 %      FEV1FVC-Pre   Date Value Ref Range Status   10/16/2023 81 %      No results found for: \"20029\"  FEFMax-Pred   Date Value Ref Range Status   10/16/2023 7.05 L/sec      FEFMax-Pre   Date Value Ref Range Status   10/16/2023 5.76 L/sec      FEFMax-%Pred-Pre   Date Value Ref Range Status   10/16/2023 81 %      ExpTime-Pre   Date Value Ref Range Status   10/16/2023 6.86 sec      FIFMax-Pre   Date Value Ref Range Status   10/16/2023 2.27 L/sec      FEV1FEV6-Pred   Date Value Ref Range Status   10/16/2023 84 %      FEV1FEV6-Pre   Date Value Ref Range Status   10/16/2023 81 %      No results found for: \"20055\"   Imaging (personally reviewed in clinic today): CT Chest 07/25/2023  IMPRESSION:   1.  Scattered bilateral consolidative opacities with peripheral predominance reminiscent of scattered emboli, metastases, autoimmune/inflammatory disorder such as vasculitis, or atypical infectious process including tuberculosis.  2.  Asymmetry of the thyroid with heterogenous attenuation of the right lobe suggestive of possible lesion. Can consider follow-up with ultrasound if there is clinical concern for thyroid pathology..        Assessment and Plan:   Abnormal CT chest  Scattered bilateral consolidative opacities with peripheral " predominance which may be suggestive of recent COVID infection, pulmonary emboli, secondary metastasis to the lungs, vasculitis or atypical infection such as fungi or tuberculosis. For further evaluation of all these differentials, I will get a CT Pulmonary Angio Chest with abdominal/pelvis for evaluation of malignancy and pulmonary embolism. We will also get lab work for YENNI, ANCA, fungal serologies and antigen etc.  Her risk factor for pulmonary embolism is use of OCPs and we will consider stopping if there are findings of emboli. We will discuss further evaluation and treatments based on findings of her work up. If her CT imaging is unremarkable, then it is very likely that these peripherally based consolidations may have been related to her COVID infection based on her history.    Questions and concerns were answered to the patient's satisfaction.  she was provided with my contact information should new questions or concerns arise in the interim.  She should return to clinic in depending on her work up findings  I spent a total of 60 minutes face to face with Nancie Gonzalez during today's office visit. Over 50% of this time was spent counseling the patient and/or coordinating care regarding their pulmonary disease.     Up to date on vaccination  Mekhi Goodrich MD  Pulmonary, Critical Care and Sleep Medicine  [email or web address]  Pager: [phone]        The above note was dictated using voice recognition software and may include typographical errors. Please contact the author for any clarifications.

## 2023-10-17 LAB
ANA SER QL IF: NEGATIVE
ANCA AB PATTERN SER IF-IMP: NORMAL
C-ANCA TITR SER IF: NORMAL {TITER}
DLCOUNC-%PRED-PRE: 99 %
DLCOUNC-PRE: 21.55 ML/MIN/MMHG
DLCOUNC-PRED: 21.75 ML/MIN/MMHG
ERV-%PRED-PRE: 60 %
ERV-PRE: 0.75 L
ERV-PRED: 1.24 L
EXPTIME-PRE: 6.86 SEC
FEF2575-%PRED-PRE: 95 %
FEF2575-PRE: 2.92 L/SEC
FEF2575-PRED: 3.05 L/SEC
FEFMAX-%PRED-PRE: 81 %
FEFMAX-PRE: 5.76 L/SEC
FEFMAX-PRED: 7.05 L/SEC
FEV1-%PRED-PRE: 101 %
FEV1-PRE: 2.9 L
FEV1FEV6-PRE: 81 %
FEV1FEV6-PRED: 84 %
FEV1FVC-PRE: 81 %
FEV1FVC-PRED: 83 %
FEV1SVC-PRE: 81 %
FEV1SVC-PRED: 80 %
FIFMAX-PRE: 2.27 L/SEC
FRCPLETH-%PRED-PRE: 82 %
FRCPLETH-PRE: 2.25 L
FRCPLETH-PRED: 2.71 L
FVC-%PRED-PRE: 104 %
FVC-PRE: 3.6 L
FVC-PRED: 3.45 L
GAMMA INTERFERON BACKGROUND BLD IA-ACNC: 0 IU/ML
IC-%PRED-PRE: 113 %
IC-PRE: 2.82 L
IC-PRED: 2.48 L
M TB IFN-G BLD-IMP: NEGATIVE
M TB IFN-G CD4+ BCKGRND COR BLD-ACNC: 10 IU/ML
MITOGEN IGNF BCKGRD COR BLD-ACNC: 0 IU/ML
MITOGEN IGNF BCKGRD COR BLD-ACNC: 0.15 IU/ML
QUANTIFERON MITOGEN: 10 IU/ML
QUANTIFERON NIL TUBE: 0 IU/ML
QUANTIFERON TB1 TUBE: 0.15 IU/ML
QUANTIFERON TB2 TUBE: 0
RVPLETH-%PRED-PRE: 93 %
RVPLETH-PRE: 1.5 L
RVPLETH-PRED: 1.6 L
TLCPLETH-%PRED-PRE: 100 %
TLCPLETH-PRE: 5.07 L
TLCPLETH-PRED: 5.02 L
VA-%PRED-PRE: 94 %
VA-PRE: 4.67 L
VC-%PRED-PRE: 99 %
VC-PRE: 3.57 L
VC-PRED: 3.58 L

## 2023-10-17 NOTE — RESULT ENCOUNTER NOTE
Interval decreased size of prominence of the multifocal, lobular, nodular soft tissue abnormalities scattered throughout bilateral lungs, some of which are completely resolved and only demonstrate residual groundglass opacities

## 2023-10-18 LAB
1,3 BETA GLUCAN SER-MCNC: <31 PG/ML
OBSERVATION IMP: NEGATIVE

## 2023-10-19 LAB
GALACTOMANNAN AG SERPL QL IA: NEGATIVE
GALACTOMANNAN AG SPEC IA-ACNC: 0.06
SCANNED LAB RESULT: NORMAL

## 2023-10-20 LAB
ASPERGILLUS AB TITR SER CF: ABNORMAL {TITER}
B DERMAT AB SER-ACNC: 0.8 IV
COCCIDIOIDES AB TITR SER CF: ABNORMAL {TITER}
H CAPSUL MYC AB TITR SER CF: ABNORMAL {TITER}
H CAPSUL YST AB TITR SER CF: ABNORMAL {TITER}

## 2023-11-26 ENCOUNTER — HEALTH MAINTENANCE LETTER (OUTPATIENT)
Age: 38
End: 2023-11-26

## 2024-02-04 ENCOUNTER — HEALTH MAINTENANCE LETTER (OUTPATIENT)
Age: 39
End: 2024-02-04

## 2024-03-28 SDOH — HEALTH STABILITY: PHYSICAL HEALTH: ON AVERAGE, HOW MANY DAYS PER WEEK DO YOU ENGAGE IN MODERATE TO STRENUOUS EXERCISE (LIKE A BRISK WALK)?: 2 DAYS

## 2024-03-28 SDOH — HEALTH STABILITY: PHYSICAL HEALTH: ON AVERAGE, HOW MANY MINUTES DO YOU ENGAGE IN EXERCISE AT THIS LEVEL?: 20 MIN

## 2024-03-28 ASSESSMENT — SOCIAL DETERMINANTS OF HEALTH (SDOH): HOW OFTEN DO YOU GET TOGETHER WITH FRIENDS OR RELATIVES?: TWICE A WEEK

## 2024-03-28 NOTE — COMMUNITY RESOURCES LIST (ENGLISH)
March 28, 2024           YOUR PERSONALIZED LIST OF SERVICES & PROGRAMS           & SHELTER    Housing      Free - Client Services  [address] [place] [address] (Distance: 13.7 miles)  Phone: [phone]  Website: https://[employer]/  Language: English  Fee: Free  Transportation Options: Free transportation      [facility] YOUTH jail  Phone: [phone]  Website: https://www.[employer].org/  Language: English      [facility] - Housing Stabilization Services  Phone: [phone]  Website: https://[employer])/Housing-Stabilization.html  Language: English  Hours: Mon 9:00 AM - 5:00 PM Tue 9:00 AM - 5:00 PM Wed 9:00 AM - 5:00 PM Thu 9:00 AM - 5:00 PM Fri 9:00 AM - 5:00 PM  Fee: Insurance  Accessibility: Deaf or hard of hearing, Translation services    Case Management      Living - Housing Stabilization Services  [address] [place] [address] (Distance: 7.4 miles)  Phone: [phone]  Website: https://Rowl.flaveit  Language: Armenian, English, Bangladeshi  Fee: Insurance, Self pay      [facility] - Housing Stabilization Services (HSS)  Phone: [phone]  Website: https://www.[employer]/resources  Language: English, Romanian  Hours: Mon 8:00 AM - 4:00 PM Tue 8:00 AM - 4:00 PM Wed 8:00 AM - 4:00 PM Thu 8:00 AM - 4:00 PM Fri 8:00 AM - 4:00 PM  Fee: Free, Insurance  Accessibility: Ada accessible, Blind accommodation, Deaf or hard of hearing, Translation services      [employer] - Housing Stabilization Services  Phone: [phone]  Website: https://homebasemn.com/  Language: English  Hours: Mon 8:00 AM - 4:00 PM Tue 8:00 AM - 4:00 PM Wed 8:00 AM - 4:00 PM Thu 8:00 AM - 4:00 PM Fri 8:00 AM - 4:00 PM  Fee: Free  Accessibility: Blind accommodation, Deaf or hard of hearing  Transportation Options: Free transportation    Drop-In Services      [facility] - Warming or cooling center - [place]  [facility]  [address] [place] [address] (Distance: 1.2 miles)  Language: English  Fee: Free      Inc - CHEMICAL & BEHAVIORAL HEALTH SERVICES/[place]  [address] [place] [address] (Distance: 2.2 miles)  Phone: [phone]  Website: https://www.[facility]/our-services/chemical-and-behavioral-health-services/#andrew  Language: English      LOVE - LAUNDRY LOVE  Website: http://www.laundrylove.org               IMPORTANT NUMBERS & WEBSITES        Emergency Services  911  .   United Way  211 http://211unitedway.org  .   Poison Control  (248) 591-8830 http://[place]poison.org http://wisconsinpoison.org  .     Suicide and Crisis Lifeline  988 http://988Fonduline.org  .   Childhelp National Child Abuse Hotline  220.484.4595 http://Childhelphotline.org   .   National Sexual Assault Hotline  (900) 153-8366 (HOPE) http://Innovative Roadsn.org   .     National Runaway Safeline  (542) 774-6263 (RUNAWAY) http://Cav[employer] KineticsruBlogCN.org  .   Pregnancy & Postpartum Support  Call/text 962-214-6575  [place]: http://ppsupportmn.org  [place]: http://Arvinas.com/wi  .   Substance Abuse National Helpline ([facility])  163-456-HELP (7464) http://Findtreatment.gov   .                DISCLAIMER: [employer] does not endorse any service providers mentioned in this resource list. [employer] does not guarantee that the services mentioned in this resource list will be available to you or will improve your health or wellness.    [facility]

## 2024-03-29 ENCOUNTER — OFFICE VISIT (OUTPATIENT)
Dept: FAMILY MEDICINE | Facility: CLINIC | Age: 39
End: 2024-03-29
Payer: COMMERCIAL

## 2024-03-29 VITALS
SYSTOLIC BLOOD PRESSURE: 125 MMHG | TEMPERATURE: 97.8 F | HEART RATE: 87 BPM | DIASTOLIC BLOOD PRESSURE: 84 MMHG | OXYGEN SATURATION: 100 % | HEIGHT: 64 IN | WEIGHT: 191.5 LBS | BODY MASS INDEX: 32.69 KG/M2

## 2024-03-29 DIAGNOSIS — I10 ESSENTIAL HYPERTENSION WITH GOAL BLOOD PRESSURE LESS THAN 130/80: ICD-10-CM

## 2024-03-29 DIAGNOSIS — Z30.41 ENCOUNTER FOR SURVEILLANCE OF CONTRACEPTIVE PILLS: ICD-10-CM

## 2024-03-29 DIAGNOSIS — R73.03 PREDIABETES: ICD-10-CM

## 2024-03-29 DIAGNOSIS — L98.9 CHANGING SKIN LESION: ICD-10-CM

## 2024-03-29 DIAGNOSIS — Z00.00 ROUTINE GENERAL MEDICAL EXAMINATION AT A HEALTH CARE FACILITY: Primary | ICD-10-CM

## 2024-03-29 DIAGNOSIS — Z12.4 CERVICAL CANCER SCREENING: ICD-10-CM

## 2024-03-29 DIAGNOSIS — Z13.1 SCREENING FOR DIABETES MELLITUS (DM): ICD-10-CM

## 2024-03-29 DIAGNOSIS — Z12.31 VISIT FOR SCREENING MAMMOGRAM: ICD-10-CM

## 2024-03-29 DIAGNOSIS — Z13.29 SCREENING FOR THYROID DISORDER: ICD-10-CM

## 2024-03-29 DIAGNOSIS — Z13.0 SCREENING FOR DISORDER OF BLOOD AND BLOOD-FORMING ORGANS: ICD-10-CM

## 2024-03-29 DIAGNOSIS — Z13.6 CARDIOVASCULAR SCREENING; LDL GOAL LESS THAN 130: ICD-10-CM

## 2024-03-29 LAB
ALBUMIN SERPL BCG-MCNC: 4.2 G/DL (ref 3.5–5.2)
ALP SERPL-CCNC: 53 U/L (ref 40–150)
ALT SERPL W P-5'-P-CCNC: 5 U/L (ref 0–50)
ANION GAP SERPL CALCULATED.3IONS-SCNC: 14 MMOL/L (ref 7–15)
AST SERPL W P-5'-P-CCNC: 18 U/L (ref 0–45)
BILIRUB SERPL-MCNC: 0.2 MG/DL
BUN SERPL-MCNC: 14.4 MG/DL (ref 6–20)
CALCIUM SERPL-MCNC: 9 MG/DL (ref 8.6–10)
CHLORIDE SERPL-SCNC: 106 MMOL/L (ref 98–107)
CHOLEST SERPL-MCNC: 212 MG/DL
CREAT SERPL-MCNC: 0.71 MG/DL (ref 0.51–0.95)
DEPRECATED HCO3 PLAS-SCNC: 17 MMOL/L (ref 22–29)
EGFRCR SERPLBLD CKD-EPI 2021: >90 ML/MIN/1.73M2
ERYTHROCYTE [DISTWIDTH] IN BLOOD BY AUTOMATED COUNT: 12.3 % (ref 10–15)
FASTING STATUS PATIENT QL REPORTED: YES
GLUCOSE SERPL-MCNC: 145 MG/DL (ref 70–99)
HBA1C MFR BLD: 6.5 % (ref 0–5.6)
HCT VFR BLD AUTO: 39.8 % (ref 35–47)
HDLC SERPL-MCNC: 39 MG/DL
HGB BLD-MCNC: 13.3 G/DL (ref 11.7–15.7)
LDLC SERPL CALC-MCNC: ABNORMAL MG/DL
LDLC SERPL DIRECT ASSAY-MCNC: 92 MG/DL
MCH RBC QN AUTO: 27.9 PG (ref 26.5–33)
MCHC RBC AUTO-ENTMCNC: 33.4 G/DL (ref 31.5–36.5)
MCV RBC AUTO: 84 FL (ref 78–100)
NONHDLC SERPL-MCNC: 173 MG/DL
PLATELET # BLD AUTO: 204 10E3/UL (ref 150–450)
POTASSIUM SERPL-SCNC: 4.6 MMOL/L (ref 3.4–5.3)
PROT SERPL-MCNC: 6.9 G/DL (ref 6.4–8.3)
RBC # BLD AUTO: 4.76 10E6/UL (ref 3.8–5.2)
SODIUM SERPL-SCNC: 137 MMOL/L (ref 135–145)
TRIGL SERPL-MCNC: 433 MG/DL
TSH SERPL DL<=0.005 MIU/L-ACNC: 2.91 UIU/ML (ref 0.3–4.2)
WBC # BLD AUTO: 8.2 10E3/UL (ref 4–11)

## 2024-03-29 PROCEDURE — 87624 HPV HI-RISK TYP POOLED RSLT: CPT | Performed by: NURSE PRACTITIONER

## 2024-03-29 PROCEDURE — G0145 SCR C/V CYTO,THINLAYER,RESCR: HCPCS | Performed by: NURSE PRACTITIONER

## 2024-03-29 PROCEDURE — 80061 LIPID PANEL: CPT | Performed by: NURSE PRACTITIONER

## 2024-03-29 PROCEDURE — 84443 ASSAY THYROID STIM HORMONE: CPT | Performed by: NURSE PRACTITIONER

## 2024-03-29 PROCEDURE — 99213 OFFICE O/P EST LOW 20 MIN: CPT | Mod: 25 | Performed by: NURSE PRACTITIONER

## 2024-03-29 PROCEDURE — 36415 COLL VENOUS BLD VENIPUNCTURE: CPT | Performed by: NURSE PRACTITIONER

## 2024-03-29 PROCEDURE — 80053 COMPREHEN METABOLIC PANEL: CPT | Performed by: NURSE PRACTITIONER

## 2024-03-29 PROCEDURE — 83036 HEMOGLOBIN GLYCOSYLATED A1C: CPT | Performed by: NURSE PRACTITIONER

## 2024-03-29 PROCEDURE — 99396 PREV VISIT EST AGE 40-64: CPT | Performed by: NURSE PRACTITIONER

## 2024-03-29 PROCEDURE — 83721 ASSAY OF BLOOD LIPOPROTEIN: CPT | Mod: 59 | Performed by: NURSE PRACTITIONER

## 2024-03-29 PROCEDURE — 85027 COMPLETE CBC AUTOMATED: CPT | Performed by: NURSE PRACTITIONER

## 2024-03-29 RX ORDER — LISINOPRIL 10 MG/1
10 TABLET ORAL DAILY
Qty: 90 TABLET | Refills: 3 | Status: SHIPPED | OUTPATIENT
Start: 2024-03-29

## 2024-03-29 RX ORDER — DESOGESTREL AND ETHINYL ESTRADIOL 0.15-0.03
1 KIT ORAL DAILY
Qty: 84 TABLET | Refills: 4 | Status: SHIPPED | OUTPATIENT
Start: 2024-03-29

## 2024-03-29 NOTE — RESULT ENCOUNTER NOTE
Jayshree Gonzalez,    Attached are your test results.  -Normal red blood cell (hgb) levels, normal white blood cell count and normal platelet levels.  So A1C is in just in the diabetic range. Would you like to try diet and recheck in 3 months and if still elevated and start on medications or would you want to try something like Rybelsis?   Please contact us if you have any questions.    Balbina Armstrong, CNP

## 2024-03-29 NOTE — PATIENT INSTRUCTIONS
PLAN:   1.   Symptomatic therapy suggested: Increase calcium to 1000mg and 800iu Vit D   Continue current medications as prescribed.     2.  Orders Placed This Encounter   Medications    lisinopril (ZESTRIL) 10 MG tablet     Sig: Take 1 tablet (10 mg) by mouth daily     Dispense:  90 tablet     Refill:  3    desogestrel-ethinyl estradiol (ISIBLOOM) 0.15-30 MG-MCG tablet     Sig: Take 1 tablet by mouth daily     Dispense:  84 tablet     Refill:  4     Orders Placed This Encounter   Procedures    MA SCREENING DIGITAL BILAT - Future  (s+30)    Lipid panel reflex to direct LDL Fasting    CBC with platelets    Comprehensive metabolic panel    TSH with free T4 reflex    Hemoglobin A1c    Albumin Random Urine Quantitative with Creat Ratio    Adult Dermatology  Referral    Pap screen with HPV - recommended age 30 - 65 years     3.  FURTHER TESTING:       - mammogram  Work on weight loss  Regular exercise  Will follow up and/or notify patient of  results via My Chart to determine further need for followup  Follow up office visit in one year for annual health maintenance exam, sooner PRN.   Preventive Care Advice   This is general advice given by our system to help you stay healthy. However, your care team may have specific advice just for you. Please talk to your care team about your preventive care needs.  Nutrition  Eat 5 or more servings of fruits and vegetables each day.  Try wheat bread, brown rice and whole grain pasta (instead of white bread, rice, and pasta).  Get enough calcium and vitamin D. Check the label on foods and aim for 100% of the RDA (recommended daily allowance).  Lifestyle  Exercise at least 150 minutes each week   (30 minutes a day, 5 days a week).  Do muscle strengthening activities 2 days a week. These help control your weight and prevent disease.  No smoking.  Wear sunscreen to prevent skin cancer.  Have a dental exam and cleaning every 6 months.  Yearly exams  See your health care team  every year to talk about:  Any changes in your health.  Any medicines your care team has prescribed.  Preventive care, family planning, and ways to prevent chronic diseases.  Shots (vaccines)   HPV shots (up to age 26), if you've never had them before.  Hepatitis B shots (up to age 59), if you've never had them before.  COVID-19 shot: Get this shot when it's due.  Flu shot: Get a flu shot every year.  Tetanus shot: Get a tetanus shot every 10 years.  Pneumococcal, hepatitis A, and RSV shots: Ask your care team if you need these based on your risk.  Shingles shot (for age 50 and up).  General health tests  Diabetes screening:  Starting at age 35, Get screened for diabetes at least every 3 years.  If you are younger than age 35, ask your care team if you should be screened for diabetes.  Cholesterol test: At age 39, start having a cholesterol test every 5 years, or more often if advised.  Bone density scan (DEXA): At age 50, ask your care team if you should have this scan for osteoporosis (brittle bones).  Hepatitis C: Get tested at least once in your life.  STIs (sexually transmitted infections)  Before age 24: Ask your care team if you should be screened for STIs.  After age 24: Get screened for STIs if you're at risk. You are at risk for STIs (including HIV) if:  You are sexually active with more than one person.  You don't use condoms every time.  You or a partner was diagnosed with a sexually transmitted infection.  If you are at risk for HIV, ask about PrEP medicine to prevent HIV.  Get tested for HIV at least once in your life, whether you are at risk for HIV or not.  Cancer screening tests  Cervical cancer screening: If you have a cervix, begin getting regular cervical cancer screening tests at age 21. Most people who have regular screenings with normal results can stop after age 65. Talk about this with your provider.  Breast cancer scan (mammogram): If you've ever had breasts, begin having regular mammograms  starting at age 40. This is a scan to check for breast cancer.  Colon cancer screening: It is important to start screening for colon cancer at age 45.  Have a colonoscopy test every 10 years (or more often if you're at risk) Or, ask your provider about stool tests like a FIT test every year or Cologuard test every 3 years.  To learn more about your testing options, visit: https://www.Pzoom/662931.pdf.  For help making a decision, visit: https://bit.ly/vk08893.  Prostate cancer screening test: If you have a prostate and are age 55 to 69, ask your provider if you would benefit from a yearly prostate cancer screening test.  Lung cancer screening: If you are a current or former smoker age 50 to 80, ask your care team if ongoing lung cancer screenings are right for you.  For informational purposes only. Not to replace the advice of your health care provider. Copyright   2023 [facility] [employer]. All rights reserved. Clinically reviewed by the [facility] Transitions Program. Leinentausch 321429 - REV 01/24.    Learning About Stress  What is stress?     Stress is your body's response to a hard situation. Your body can have a physical, emotional, or mental response. Stress is a fact of life for most people, and it affects everyone differently. What causes stress for you may not be stressful for someone else.  A lot of things can cause stress. You may feel stress when you go on a job interview, take a test, or run a race. This kind of short-term stress is normal and even useful. It can help you if you need to work hard or react quickly. For example, stress can help you finish an important job on time.  Long-term stress is caused by ongoing stressful situations or events. Examples of long-term stress include long-term health problems, ongoing problems at work, or conflicts in your family. Long-term stress can harm your health.  How does stress affect your health?  When you are stressed, your body responds as  though you are in danger. It makes hormones that speed up your heart, make you breathe faster, and give you a burst of energy. This is called the fight-or-flight stress response. If the stress is over quickly, your body goes back to normal and no harm is done.  But if stress happens too often or lasts too long, it can have bad effects. Long-term stress can make you more likely to get sick, and it can make symptoms of some diseases worse. If you tense up when you are stressed, you may develop neck, shoulder, or low back pain. Stress is linked to high blood pressure and heart disease.  Stress also harms your emotional health. It can make you piper, tense, or depressed. Your relationships may suffer, and you may not do well at work or school.  What can you do to manage stress?  You can try these things to help manage stress:   Do something active. Exercise or activity can help reduce stress. Walking is a great way to get started. Even everyday activities such as housecleaning or yard work can help.  Try yoga or alison chi. These techniques combine exercise and meditation. You may need some training at first to learn them.  Do something you enjoy. For example, listen to music or go to a movie. Practice your hobby or do volunteer work.  Meditate. This can help you relax, because you are not worrying about what happened before or what may happen in the future.  Do guided imagery. Imagine yourself in any setting that helps you feel calm. You can use online videos, books, or a teacher to guide you.  Do breathing exercises. For example:  From a standing position, bend forward from the waist with your knees slightly bent. Let your arms dangle close to the floor.  Breathe in slowly and deeply as you return to a standing position. Roll up slowly and lift your head last.  Hold your breath for just a few seconds in the standing position.  Breathe out slowly and bend forward from the waist.  Let your feelings out. Talk, laugh, cry,  "and express anger when you need to. Talking with supportive friends or family, a counselor, or a екатерина leader about your feelings is a healthy way to relieve stress. Avoid discussing your feelings with people who make you feel worse.  Write. It may help to write about things that are bothering you. This helps you find out how much stress you feel and what is causing it. When you know this, you can find better ways to cope.  What can you do to prevent stress?  You might try some of these things to help prevent stress:  Manage your time. This helps you find time to do the things you want and need to do.  Get enough sleep. Your body recovers from the stresses of the day while you are sleeping.  Get support. Your family, friends, and community can make a difference in how you experience stress.  Limit your news feed. Avoid or limit time on social media or news that may make you feel stressed.  Do something active. Exercise or activity can help reduce stress. Walking is a great way to get started.  Where can you learn more?  Go to https://www.[employer].net/patiented  Enter N032 in the search box to learn more about \"Learning About Stress.\"  Current as of: October 24, 2023               Content Version: 14.0    6639-6912 [employer].   Care instructions adapted under license by your healthcare professional. If you have questions about a medical condition or this instruction, always ask your healthcare professional. [employer] disclaims any warranty or liability for your use of this information.      "

## 2024-03-29 NOTE — PROGRESS NOTES
Preventive Care Visit  [facility]  Balbina EMANI Drew CNP, Internal Medicine - Pediatrics  Mar 29, 2024      Assessment & Plan     Routine general medical examination at a health care facility    Visit for screening mammogram  - MA SCREENING DIGITAL BILAT - Future  (s+30)    Cervical cancer screening  - Pap screen with HPV - recommended age 30 - 65 years  - HPV Hold (Lab Only)  - HPV High Risk Types DNA Cervical    CARDIOVASCULAR SCREENING; LDL GOAL LESS THAN 130  - Lipid panel reflex to direct LDL Fasting  - LDL cholesterol direct    Screening for disorder of blood and blood-forming organs  - CBC with platelets    Screening for thyroid disorder  - TSH with free T4 reflex    Prediabetes  - Hemoglobin A1c    Screening for diabetes mellitus (DM)  - Comprehensive metabolic panel    Changing skin lesion left shoulder  - Adult Dermatology  Referral    Essential hypertension with goal blood pressure less than 130/80  HTN Plan:  1)  Medication: continue current medication regimen unchanged  2)  Dietary sodium restriction  3)  Regular aerobic exercise  4)  Recheck in 1 year, sooner should new symptoms or   problems arise.  5) See todays orders.    Patient Education: Reviewed risks of hypertension and principles of   treatment.    - lisinopril (ZESTRIL) 10 MG tablet  Dispense: 90 tablet; Refill: 3  - Albumin Random Urine Quantitative with Creat Ratio    Encounter for surveillance of contraceptive pills  The current medical regimen is effective.  Continue current medication regimen unchanged.  - desogestrel-ethinyl estradiol (ISIBLOOM) 0.15-30 MG-MCG tablet  Dispense: 84 tablet; Refill: 4      Patient has been advised of split billing requirements and indicates understanding: Yes  Ordering of each unique test  Prescription drug management  No LOS data to display   Time spent by me doing chart review, history and exam, documentation and further activities per the  "note      BMI  Estimated body mass index is 32.69 kg/m  as calculated from the following:    Height as of this encounter: 1.63 m (5' 4.17\").    Weight as of this encounter: 86.9 kg (191 lb 8 oz).   Weight management plan: Discussed healthy diet and exercise guidelines    Counseling  Appropriate preventive services were discussed with this patient, including applicable screening as appropriate for fall prevention, nutrition, physical activity, Tobacco-use cessation, weight loss and cognition.  Checklist reviewing preventive services available has been given to the patient.  Reviewed patient's diet, addressing concerns and/or questions.   She is at risk for lack of exercise and has been provided with information to increase physical activity for the benefit of her well-being.       FUTURE APPOINTMENTS:       - Follow-up for annual visit or as needed  See Patient Instructions    Elissa Canada is a 38 year old, presenting for the following:  Physical        3/29/2024     7:03 AM   Additional Questions   Roomed by Sina        Via the [employer] questionnaire, the patient has reported the following services have been completed -Eye Exam, this information has been sent to the abstraction team.  Health Care Directive  Patient does not have a Health Care Directive or Living Will: Discussed advance care planning with patient; however, patient declined at this time.    HPI        3/28/2024   General Health   How would you rate your overall physical health? Good   Feel stress (tense, anxious, or unable to sleep) To some extent   (!) STRESS CONCERN      3/28/2024   Nutrition   Three or more servings of calcium each day? Yes   Diet: Vegetarian/vegan   How many servings of fruit and vegetables per day? (!) 2-3   How many sweetened beverages each day? 0-1         3/28/2024   Exercise   Days per week of moderate/strenous exercise 2 days   Average minutes spent exercising at this level 20 min   (!) EXERCISE CONCERN     "  3/28/2024   Social Factors   Frequency of gathering with friends or relatives Twice a week   Worry food won't last until get money to buy more No   Food not last or not have enough money for food? No   Do you have housing?  No   Are you worried about losing your housing? No   Lack of transportation? No   Unable to get utilities (heat,electricity)? No   Want help with housing or utility concern? No   (!) HOUSING CONCERN PRESENT      3/28/2024   Dental   Dentist two times every year? Yes            Today's PHQ-2 Score:       3/28/2024    10:24 AM   PHQ-2 ( 1999 [employer])   Q1: Little interest or pleasure in doing things 0   Q2: Feeling down, depressed or hopeless 0   PHQ-2 Score 0   Q1: Little interest or pleasure in doing things Not at all   Q2: Feeling down, depressed or hopeless Not at all   PHQ-2 Score 0           3/28/2024   Substance Use   Alcohol more than 3/day or more than 7/wk No   Do you use any other substances recreationally? No     Social History     Tobacco Use    Smoking status: Never    Smokeless tobacco: Never   Vaping Use    Vaping Use: Never used   Substance Use Topics    Alcohol use: Yes     Comment: Rarely    Drug use: No           3/29/2023   LAST FHS-7 RESULTS   1st degree relative breast or ovarian cancer No   Any relative bilateral breast cancer No   Any male have breast cancer No   Any ONE woman have BOTH breast AND ovarian cancer No   Any woman with breast cancer before 50yrs No   2 or more relatives with breast AND/OR ovarian cancer No   2 or more relatives with breast AND/OR bowel cancer No        Mammogram Screening - Mammogram every 1-2 years updated in Health Maintenance based on mutual decision making        3/28/2024   STI Screening   New sexual partner(s) since last STI/HIV test? No     History of abnormal Pap smear: NO - age 30-65 PAP every 5 years with negative HPV co-testing recommended        Latest Ref Rng & Units 1/4/2019     9:00 AM 12/29/2015     8:40 AM 12/29/2015    12:00  AM   PAP / HPV   PAP (Historical)  NIL   NIL    HPV 16 DNA NEG^Negative Negative  Negative     HPV 18 DNA NEG^Negative Negative  Negative     Other HR HPV NEG^Negative Negative  Negative       ASCVD Risk   The 10-year ASCVD risk score (Romain LORA, et al., 2019) is: 4.4%    Values used to calculate the score:      Age: 38 years      Sex: Female      Is Non- : No      Diabetic: Yes      Tobacco smoker: No      Systolic Blood Pressure: 125 mmHg      Is BP treated: Yes      HDL Cholesterol: 27 mg/dL      Total Cholesterol: 176 mg/dL        3/28/2024   Contraception/Family Planning   Questions about contraception or family planning No        Reviewed and updated as needed this visit by Provider                    Past Medical History:   Diagnosis Date    Cerebral aneurysm, nonruptured 5/7/2023    IVETT I (cervical intraepithelial neoplasia I)     Pt reported    Diabetes mellitus, type 2 (H) 5/9/2023    Low HDL (under 40) 5/10/2023     Past Surgical History:   Procedure Laterality Date    BIOPSY      COLPOSCOPY CERVIX, BIOPSY CERVIX, ENDOCERVICAL CURETTAGE, COMBINED  06/24/2008 2011     Lab work is in process  Labs reviewed in EPIC  BP Readings from Last 3 Encounters:   03/29/24 125/84   10/16/23 125/79   06/20/23 138/88    Wt Readings from Last 3 Encounters:   03/29/24 86.9 kg (191 lb 8 oz)   10/16/23 78 kg (172 lb)   10/16/23 78.2 kg (172 lb 8 oz)                  Patient Active Problem List   Diagnosis    History of abnormal cervical Pap smear    History of HPV infection    CARDIOVASCULAR SCREENING; LDL GOAL LESS THAN 160    Class 1 obesity due to excess calories without serious comorbidity with body mass index (BMI) of 32.0 to 32.9 in adult    Dysplasia of cervix, low grade (IVETT 1)    Hypertriglyceridemia    PCOS (polycystic ovarian syndrome)    Dyslipidemia    Cervical cancer screening    Obesity (BMI 30-39.9)    Diabetes mellitus, type 2 (H)    Low HDL (under 40)    Brain aneurysm      Past Surgical History:   Procedure Laterality Date    BIOPSY      COLPOSCOPY CERVIX, BIOPSY CERVIX, ENDOCERVICAL CURETTAGE, COMBINED  06/24/2008 2011       Social History     Tobacco Use    Smoking status: Never    Smokeless tobacco: Never   Substance Use Topics    Alcohol use: Yes     Comment: Rarely     Family History   Problem Relation Age of Onset    Hyperlipidemia Mother     Hypertension Mother     Hypertension Father     Hyperlipidemia Brother     Diabetes Maternal Grandmother     Ovarian Cancer Maternal Grandmother     Other Cancer Maternal Grandmother         Overian    Coronary Artery Disease Maternal Grandfather     Hyperlipidemia Brother     Cerebrovascular Disease No family hx of     Thyroid Disease No family hx of     Breast Cancer No family hx of     Uterine Cancer No family hx of     Prostate Cancer No family hx of     Colorectal Cancer No family hx of     Pancreatic Cancer No family hx of     Lung Cancer No family hx of     Melanoma No family hx of     Autoimmune Disease No family hx of          Current Outpatient Medications   Medication Sig Dispense Refill    desogestrel-ethinyl estradiol (ISIBLOOM) 0.15-30 MG-MCG tablet Take 1 tablet by mouth daily 84 tablet 4    lisinopril (ZESTRIL) 10 MG tablet TAKE 1 TABLET(10 MG) BY MOUTH DAILY 90 tablet 1     Allergies   Allergen Reactions    Amoxicillin Anaphylaxis    Penicillins Anaphylaxis     Recent Labs   Lab Test 10/16/23  1342 07/21/23  0727 03/30/23  0904 04/25/22  0731 04/25/22  0731 03/29/21  0913 01/03/20  0829   A1C  --  6.1* 6.8*  --  6.3*  --  5.2   LDL  --   --  60  --  68 Cannot estimate LDL when triglyceride exceeds 400 mg/dL  72 Cannot estimate LDL when triglyceride exceeds 400 mg/dL  122*   HDL  --   --  27*  --  30* 32* 42*   TRIG  --   --  717*  --  403* 464* 403*   ALT  --   --  13  --  14 17  --    CR 0.8 0.99* 0.76   < > 0.76 0.83 0.74   GFRESTIMATED >60 74 >90   < > >90 90 >90   GFRESTBLACK  --   --   --   --   --  >90 >90  "  POTASSIUM  --  4.2 4.4   < > 4.2 4.2 4.3   TSH  --   --  2.88  --  3.88 3.28 3.44    < > = values in this interval not displayed.        CONSTITUTIONAL:NEGATIVE for fever, chills, change in weight  INTEGUMENTARY/SKIN: NEGATIVE for non-healing lesion  and POSITIVE for pink raised lesion on left shoulder   EYES: NEGATIVE for vision changes or irritation  ENT: NEGATIVE for ear, mouth and throat problems  RESP:NEGATIVE for significant cough or SOB  BREAST: NEGATIVE for masses, tenderness or discharge  CV: NEGATIVE for chest pain, palpitations or peripheral edema  GI: NEGATIVE for nausea, abdominal pain, heartburn, or change in bowel habits   female: normal menses, no unusual urinary symptoms, and no unusual vaginal symptoms  MUSCULOSKELETAL:NEGATIVE for significant arthralgias or myalgia  NEURO: NEGATIVE for weakness, dizziness or paresthesias  ENDOCRINE: NEGATIVE for temperature intolerance, skin/hair changes  HEME/ALLERGY/IMMUNE: POSITIVE  for allergies and NEGATIVE for anemia and bleeding disorder  PSYCHIATRIC: POSITIVE for stress and NEGATIVE forHx anxiety and Hx depression        Objective    Exam  /84 (BP Location: Right arm, Patient Position: Sitting, Cuff Size: Adult Large)   Pulse 87   Temp 97.8  F (36.6  C) (Oral)   Ht 1.63 m (5' 4.17\")   Wt 86.9 kg (191 lb 8 oz)   LMP 02/01/2024 (Approximate)   SpO2 100%   BMI 32.69 kg/m     Estimated body mass index is 32.69 kg/m  as calculated from the following:    Height as of this encounter: 1.63 m (5' 4.17\").    Weight as of this encounter: 86.9 kg (191 lb 8 oz).  Wt Readings from Last 4 Encounters:   03/29/24 86.9 kg (191 lb 8 oz)   10/16/23 78 kg (172 lb)   10/16/23 78.2 kg (172 lb 8 oz)   07/24/23 79.4 kg (175 lb)       Physical Exam  GENERAL: alert and no distress  EYES: Eyes grossly normal to inspection and conjunctivae and sclerae normal  HENT: ear canals and TM's normal, nose and mouth without ulcers or lesions  NECK: no adenopathy, no asymmetry, " masses, or scars  RESP: lungs clear to auscultation - no rales, rhonchi or wheezes  BREAST: normal without masses, tenderness or nipple discharge and no palpable axillary masses or adenopathy  CV: regular rates and rhythm, no murmur, click or rub, peripheral pulses strong, and no peripheral edema  ABDOMEN: soft, nontender, no hepatosplenomegaly, no masses and bowel sounds normal   (female): normal female external genitalia, normal urethral meatus , normal vaginal mucosa, and normal cervix, adnexae, and uterus without masses.  MS: no gross musculoskeletal defects noted, no edema  SKIN: no suspicious lesions or rashes. Pinch raised lesion on left shoulder patient states has been changing   NEURO: Normal strength and tone, mentation intact and speech normal  PSYCH: mentation appears normal, affect normal/bright  LYMPH: no cervical, supraclavicular, axillary, or inguinal adenopathy        Signed Electronically by: EMANI Olivas CNP

## 2024-04-01 ENCOUNTER — TELEPHONE (OUTPATIENT)
Dept: FAMILY MEDICINE | Facility: CLINIC | Age: 39
End: 2024-04-01

## 2024-04-01 ENCOUNTER — ANCILLARY PROCEDURE (OUTPATIENT)
Dept: MAMMOGRAPHY | Facility: CLINIC | Age: 39
End: 2024-04-01
Attending: NURSE PRACTITIONER
Payer: COMMERCIAL

## 2024-04-01 DIAGNOSIS — Z12.31 VISIT FOR SCREENING MAMMOGRAM: ICD-10-CM

## 2024-04-01 PROCEDURE — 77067 SCR MAMMO BI INCL CAD: CPT | Mod: GC

## 2024-04-01 NOTE — RESULT ENCOUNTER NOTE
Jayshree Gonzalez,    Attached are your test results.  -HDL(good) cholesterol level is low and your triglycerides are elevated which can increase your heart disease risk.  A diet high in fat and simple carbohydrates, genetics and being overweight can contribute to this. LDL(bad) cholesterol level is normal.  ADVISE:exercising 150 minutes of aerobic exercise per week (30 minutes 5 days per week or 50 minutes 3 days per week are options), and omega-3 fatty acids (fish oil)  daily are helpful to improve this.  In 12 months, you should recheck your fasting cholesterol panel by scheduling a lab-only appointment.  -Liver and gallbladder tests (ALT,AST, Alk phos,bilirubin) are normal.  -Kidney function (GFR) is normal.  -Sodium is normal.  -Potassium is normal.  -Calcium is normal.  -Glucose is elevated due to your diabetes.  -TSH (thyroid stimulating hormone) level is normal which indicates normal thyroid function.   Please contact us if you have any questions.    Balbina Armstrong, CNP

## 2024-04-01 NOTE — TELEPHONE ENCOUNTER
"Prior Authorization Retail Medication Request    Medication/Dose: Semaglutide (RYBELSUS) 3 MG tablet  Diagnosis and ICD code (if different than what is on RX):    Type 2 diabetes mellitus with other specified complication, without long-term current use of insulin (H) [E11.69]  - Primary        New/renewal/insurance change PA/secondary ins. PA:  Previously Tried and Failed:    Rationale:      Insurance   Primary: [facility]   Insurance ID:  [id number]     Secondary (if applicable):  Insurance ID:      Pharmacy Information (if different than what is on RX)  Name:    Phone:    Fax:  Login to [email or web address] and click \" Enter a Key\"  Enter provided information bellow  Key: [id number]  Patient Last Name: Lisa  : 1985  Complete the PA and click \"Send to Plan\" for approval     "

## 2024-04-02 LAB
BKR LAB AP GYN ADEQUACY: NORMAL
BKR LAB AP GYN INTERPRETATION: NORMAL
BKR LAB AP HPV REFLEX: NORMAL
BKR LAB AP LMP: NORMAL
BKR LAB AP PREVIOUS ABNORMAL: NORMAL
PATH REPORT.COMMENTS IMP SPEC: NORMAL
PATH REPORT.COMMENTS IMP SPEC: NORMAL
PATH REPORT.RELEVANT HX SPEC: NORMAL

## 2024-04-04 LAB
HUMAN PAPILLOMA VIRUS 16 DNA: NEGATIVE
HUMAN PAPILLOMA VIRUS 18 DNA: NEGATIVE
HUMAN PAPILLOMA VIRUS FINAL DIAGNOSIS: NORMAL
HUMAN PAPILLOMA VIRUS OTHER HR: NEGATIVE

## 2024-04-11 NOTE — TELEPHONE ENCOUNTER
Prior Authorization Approval    Medication: SEMAGLUTIDE 3 MG PO TABS  Authorization Effective Date: 4/11/2024  Authorization Expiration Date: 4/11/2027  Approved Dose/Quantity:   Reference #: [id number]   Insurance Company: [employer] - Phone [phone] Fax [phone]  Which Pharmacy is filling the prescription: [id number] [address] AT [facility] [address]  Pharmacy Notified: y  Patient Notified: y - pharmacy to notify

## 2024-04-11 NOTE — TELEPHONE ENCOUNTER
PA Initiation    Medication: SEMAGLUTIDE 3 MG PO TABS  Insurance Company: [employer] - Phone [phone] Fax [phone]  Pharmacy Filling the Rx: [facility] #[id number] [place] [address] AT [facility] [address]  Filling Pharmacy Phone: [phone]  Filling Pharmacy Fax: [phone]  Start Date: 4/11/2024

## 2024-04-15 ENCOUNTER — OFFICE VISIT (OUTPATIENT)
Dept: DERMATOLOGY | Facility: CLINIC | Age: 39
End: 2024-04-15
Attending: NURSE PRACTITIONER
Payer: COMMERCIAL

## 2024-04-15 DIAGNOSIS — L30.9 DERMATITIS: Primary | ICD-10-CM

## 2024-04-15 DIAGNOSIS — L81.4 SOLAR LENTIGO: ICD-10-CM

## 2024-04-15 DIAGNOSIS — D18.01 CHERRY ANGIOMA: ICD-10-CM

## 2024-04-15 DIAGNOSIS — L82.0 BENIGN LICHENOID KERATOSIS: ICD-10-CM

## 2024-04-15 DIAGNOSIS — D22.9 MULTIPLE MELANOCYTIC NEVI: ICD-10-CM

## 2024-04-15 DIAGNOSIS — D23.9 DERMATOFIBROMA: ICD-10-CM

## 2024-04-15 PROCEDURE — 99203 OFFICE O/P NEW LOW 30 MIN: CPT | Performed by: DERMATOLOGY

## 2024-04-15 RX ORDER — TRIAMCINOLONE ACETONIDE 1 MG/G
OINTMENT TOPICAL 2 TIMES DAILY
Qty: 30 G | Refills: 3 | Status: SHIPPED | OUTPATIENT
Start: 2024-04-15

## 2024-04-15 ASSESSMENT — PAIN SCALES - GENERAL: PAINLEVEL: NO PAIN (0)

## 2024-04-15 NOTE — PROGRESS NOTES
[facility] Dermatology Note  Encounter Date: Apr 15, 2024  Office Visit     Dermatology Problem List:  1. N/A    ____________________________________________    Assessment & Plan:    1. Inflamed seborrheic keratosis vs benign lichenoid keratosis, left forearm: start triamcinolone. If incompletely resolved, consider cryotherapy. DDx eczematous dermatitis    2. Reassurance provided for benign lesions not treated today including cherry angiomata, solar lentigines, dermatofibroma (left posterior shoulder), seborrheic keratoses, and banal-appearing melanocytic nevi.      Follow-up: PRN    Staff and Scribe:   Coreyibe Disclosure:   By signing my name below, I, Olamide Young, attest that this documentation has been prepared under the direction and in the presence of Dr. Kodi Canales MD.  - Electronically Signed: Olamide Young 04/15/24     Provider Disclosure:   The documentation recorded by the scribe accurately reflects the services I personally performed and the decisions made by me.    Kodi Canales MD   of Dermatology  Department of Dermatology  [facility]      ____________________________________________    CC: Skin Check (FBSE, area of concern on left arm.  No HX of skin cancer. )    HPI:  Ms. Nancie Gonzalez is a(n) 38 year old female who presents today as a new patient for lesions    Spot on left shoulder - present for about 1 year  - no symptoms    New spot on left forearm ~1 week  - no symptoms      Patient is otherwise feeling well, without additional skin concerns.    Labs Reviewed:  N/A    Physical exam:  Vitals: LMP 02/01/2024 (Approximate)   GEN: This is a well developed, well-nourished female in no acute distress, in a pleasant mood.    SKIN: Danielle phototype II  - Full skin, which includes the head/face, both arms, chest, back, abdomen,both legs, genitalia and/or groin buttocks, digits and/or nails, was examined.  -  erythematous stuck on papule on the left forearm  - pink/tan papule with positive dimple sign on the left posterior shoulder  - There are dome shaped bright red papules on the head/neck, trunk, extremities.   - Multiple regular brown pigmented macules and papules are identified on the head/neck, trunk, extremities.   - Scattered brown macules on sun exposed areas.  - There are waxy stuck on tan to brown papules on the head/neck, trunk, extremities.  - No other lesions of concern on areas examined.     Medications:  Current Outpatient Medications   Medication Sig Dispense Refill    desogestrel-ethinyl estradiol (ISIBLOOM) 0.15-30 MG-MCG tablet Take 1 tablet by mouth daily 84 tablet 4    lisinopril (ZESTRIL) 10 MG tablet Take 1 tablet (10 mg) by mouth daily 90 tablet 3    Semaglutide (RYBELSUS) 3 MG tablet Take 3 mg by mouth daily 30 tablet 1    triamcinolone (KENALOG) 0.1 % external ointment Apply topically 2 times daily 30 g 3     No current facility-administered medications for this visit.      Past Medical History:   Patient Active Problem List   Diagnosis    History of abnormal cervical Pap smear    History of HPV infection    CARDIOVASCULAR SCREENING; LDL GOAL LESS THAN 160    Class 1 obesity due to excess calories without serious comorbidity with body mass index (BMI) of 32.0 to 32.9 in adult    Dysplasia of cervix, low grade (IVETT 1)    Hypertriglyceridemia    PCOS (polycystic ovarian syndrome)    Dyslipidemia    Cervical cancer screening    Obesity (BMI 30-39.9)    Diabetes mellitus, type 2 (H)    Low HDL (under 40)    Brain aneurysm     Past Medical History:   Diagnosis Date    Cerebral aneurysm, nonruptured 5/7/2023    IVETT I (cervical intraepithelial neoplasia I)     Pt reported    Diabetes mellitus, type 2 (H) 5/9/2023    Low HDL (under 40) 5/10/2023        CC Balbina Armstrong, APRN CNP  [address] [facility] [facility] [place] [address] on close of this encounter.

## 2024-04-15 NOTE — NURSING NOTE
Nancie Gonzalez's goals for this visit include:   Chief Complaint   Patient presents with    Skin Check     FBSE, area of concern on left arm.  No HX of skin cancer.       She requests these members of her care team be copied on today's visit information:     PCP: Balbina Armstrong    Referring Provider:  Balbina Armstrong, EMANI CNP  [address]  [place]  [place] [address]    [facility] 02/01/2024 (Approximate)     Do you need any medication refills at today's visit?     Fifi Ovalles on 4/15/2024 at 2:34 PM

## 2024-04-15 NOTE — LETTER
4/15/2024         RE: Nancie Gonzalez  [address]  [facility] [address]        Dear Colleague,    Thank you for referring your patient, Nancie Gonzalez, to the [facility]. Please see a copy of my visit note below.    [facility] D[facility]at[facility] Note  Encounter Date: Apr 15, 2024  Office Visit     Dermatology Problem List:  1. N/A    ____________________________________________    Assessment & Plan:    1. Inflamed seborrheic keratosis vs benign lichenoid keratosis, left forearm: start triamcinolone. If incompletely resolved, consider cryotherapy. DDx eczematous dermatitis    2. Reassurance provided for benign lesions not treated today including cherry angiomata, solar lentigines, dermatofibroma (left posterior shoulder), seborrheic keratoses, and banal-appearing melanocytic nevi.      Follow-up: PRN    Staff and Scribe:   Scribe Disclosure:   By signing my name below, I, Olamide Young, attest that this documentation has been prepared under the direction and in the presence of Dr. Kodi Canales MD.  - Electronically Signed: Olamide Young 04/15/24     Provider Disclosure:   The documentation recorded by the scribe accurately reflects the services I personally performed and the decisions made by me.    Kodi Canales MD   of Dermatology  Department of Dermatology  [facility]      ____________________________________________    CC: Skin Check (FBSE, area of concern on left arm.  No HX of skin cancer. )    HPI:  Ms. Nancie Gonzalez is a(n) 38 year old female who presents today as a new patient for lesions    Spot on left shoulder - present for about 1 year  - no symptoms    New spot on left forearm ~1 week  - no symptoms      Patient is otherwise feeling well, without additional skin concerns.    Labs Reviewed:  N/A    Physical exam:  Vitals: LMP 02/01/2024 (Approximate)   GEN: This is a well developed, well-nourished  female in no acute distress, in a pleasant mood.    SKIN: Danielle phototype II  - Full skin, which includes the head/face, both arms, chest, back, abdomen,both legs, genitalia and/or groin buttocks, digits and/or nails, was examined.  - erythematous stuck on papule on the left forearm  - pink/tan papule with positive dimple sign on the left posterior shoulder  - There are dome shaped bright red papules on the head/neck, trunk, extremities.   - Multiple regular brown pigmented macules and papules are identified on the head/neck, trunk, extremities.   - Scattered brown macules on sun exposed areas.  - There are waxy stuck on tan to brown papules on the head/neck, trunk, extremities.  - No other lesions of concern on areas examined.     Medications:  Current Outpatient Medications   Medication Sig Dispense Refill     desogestrel-ethinyl estradiol (ISIBLOOM) 0.15-30 MG-MCG tablet Take 1 tablet by mouth daily 84 tablet 4     lisinopril (ZESTRIL) 10 MG tablet Take 1 tablet (10 mg) by mouth daily 90 tablet 3     Semaglutide (RYBELSUS) 3 MG tablet Take 3 mg by mouth daily 30 tablet 1     triamcinolone (KENALOG) 0.1 % external ointment Apply topically 2 times daily 30 g 3     No current facility-administered medications for this visit.      Past Medical History:   Patient Active Problem List   Diagnosis     History of abnormal cervical Pap smear     History of HPV infection     CARDIOVASCULAR SCREENING; LDL GOAL LESS THAN 160     Class 1 obesity due to excess calories without serious comorbidity with body mass index (BMI) of 32.0 to 32.9 in adult     Dysplasia of cervix, low grade (IVETT 1)     Hypertriglyceridemia     PCOS (polycystic ovarian syndrome)     Dyslipidemia     Cervical cancer screening     Obesity (BMI 30-39.9)     Diabetes mellitus, type 2 (H)     Low HDL (under 40)     Brain aneurysm     Past Medical History:   Diagnosis Date     Cerebral aneurysm, nonruptured 5/7/2023     IVETT I (cervical intraepithelial  neoplasia I)     Pt reported     Diabetes mellitus, type 2 (H) 5/9/2023     Low HDL (under 40) 5/10/2023        CC Balbina Armstrong, APRN CNP  [address] [place] [address] on close of this encounter.      Again, thank you for allowing me to participate in the care of your patient.        Sincerely,        Kodi Canales MD

## 2024-05-06 DIAGNOSIS — E11.69 TYPE 2 DIABETES MELLITUS WITH OTHER SPECIFIED COMPLICATION, WITHOUT LONG-TERM CURRENT USE OF INSULIN (H): ICD-10-CM

## 2024-05-07 RX ORDER — ORAL SEMAGLUTIDE 3 MG/1
TABLET ORAL DAILY
Qty: 90 TABLET | Refills: 1 | Status: SHIPPED | OUTPATIENT
Start: 2024-05-07

## 2024-07-11 ENCOUNTER — ORDERS ONLY (AUTO-RELEASED) (OUTPATIENT)
Dept: FAMILY MEDICINE | Facility: CLINIC | Age: 39
End: 2024-07-11
Payer: COMMERCIAL

## 2024-07-11 ENCOUNTER — OFFICE VISIT (OUTPATIENT)
Dept: FAMILY MEDICINE | Facility: CLINIC | Age: 39
End: 2024-07-11
Payer: COMMERCIAL

## 2024-07-11 VITALS
OXYGEN SATURATION: 98 % | HEIGHT: 65 IN | SYSTOLIC BLOOD PRESSURE: 116 MMHG | HEART RATE: 101 BPM | RESPIRATION RATE: 18 BRPM | BODY MASS INDEX: 32.59 KG/M2 | TEMPERATURE: 98.4 F | WEIGHT: 195.6 LBS | DIASTOLIC BLOOD PRESSURE: 84 MMHG

## 2024-07-11 DIAGNOSIS — R00.2 PALPITATIONS: Primary | ICD-10-CM

## 2024-07-11 DIAGNOSIS — E11.69 TYPE 2 DIABETES MELLITUS WITH OTHER SPECIFIED COMPLICATION, WITHOUT LONG-TERM CURRENT USE OF INSULIN (H): ICD-10-CM

## 2024-07-11 DIAGNOSIS — E04.1 THYROID NODULE: ICD-10-CM

## 2024-07-11 DIAGNOSIS — R00.2 PALPITATIONS: ICD-10-CM

## 2024-07-11 LAB
ALBUMIN SERPL BCG-MCNC: 4.2 G/DL (ref 3.5–5.2)
ALP SERPL-CCNC: 44 U/L (ref 40–150)
ALT SERPL W P-5'-P-CCNC: <5 U/L (ref 0–50)
ANION GAP SERPL CALCULATED.3IONS-SCNC: 10 MMOL/L (ref 7–15)
AST SERPL W P-5'-P-CCNC: 15 U/L (ref 0–45)
BILIRUB SERPL-MCNC: 0.2 MG/DL
BUN SERPL-MCNC: 10.5 MG/DL (ref 6–20)
CALCIUM SERPL-MCNC: 9.1 MG/DL (ref 8.6–10)
CHLORIDE SERPL-SCNC: 104 MMOL/L (ref 98–107)
CREAT SERPL-MCNC: 0.8 MG/DL (ref 0.51–0.95)
DEPRECATED HCO3 PLAS-SCNC: 22 MMOL/L (ref 22–29)
EGFRCR SERPLBLD CKD-EPI 2021: >90 ML/MIN/1.73M2
GLUCOSE SERPL-MCNC: 114 MG/DL (ref 70–99)
HBA1C MFR BLD: 6.3 % (ref 0–5.6)
POTASSIUM SERPL-SCNC: 4.6 MMOL/L (ref 3.4–5.3)
PROT SERPL-MCNC: 7.4 G/DL (ref 6.4–8.3)
SODIUM SERPL-SCNC: 136 MMOL/L (ref 135–145)
TSH SERPL DL<=0.005 MIU/L-ACNC: 3.08 UIU/ML (ref 0.3–4.2)

## 2024-07-11 PROCEDURE — 36415 COLL VENOUS BLD VENIPUNCTURE: CPT | Performed by: NURSE PRACTITIONER

## 2024-07-11 PROCEDURE — 84443 ASSAY THYROID STIM HORMONE: CPT | Performed by: NURSE PRACTITIONER

## 2024-07-11 PROCEDURE — 99214 OFFICE O/P EST MOD 30 MIN: CPT | Performed by: NURSE PRACTITIONER

## 2024-07-11 PROCEDURE — 83036 HEMOGLOBIN GLYCOSYLATED A1C: CPT | Performed by: NURSE PRACTITIONER

## 2024-07-11 PROCEDURE — G2211 COMPLEX E/M VISIT ADD ON: HCPCS | Performed by: NURSE PRACTITIONER

## 2024-07-11 PROCEDURE — 80053 COMPREHEN METABOLIC PANEL: CPT | Performed by: NURSE PRACTITIONER

## 2024-07-11 NOTE — PROGRESS NOTES
Elissa Canada is a 38 year old, presenting for the following health issues:  Hospital F/U        7/11/2024     9:10 AM   Additional Questions   Roomed by Stacey     [facility]       ED/UC Followup:    Facility:  [facility]   Date of visit: 7/9/2024  Reason for visit: Heart racing   Current Status: feels a little better but still having symptoms       Palpitations/Irregular rhythm  Onset:  4 days ago   Duration: still going on   Description: felt her heart beating all night and then went into the ER in the morning   Chest pain or pressure: No  Progression of symptoms: waxing and waning  Accompanying signs and symptoms:       Shortness of breath: No       Sweating: No       Nausea/vomiting: YES- was ill on Sunday        Lightheadedness: YES- a little occasionally        Palpitations: YES- every day and will wax and wane in intensity        Fever/chills: No       Cough: No       Heartburn: YES- very mild and not usually treating this   History   Family history of heart disease: YES- grandfather had an MI   Tobacco use: No           Caffeine use: YES- one can of mountain dew            Alcohol use: YES- rarely            Drug/other chemical use: No  Previous similar symptoms: YES- about a year ago   Precipitating factors:   No  Worse with exertion: No  Worse with deep breaths: No           Related to eating: No           Better with burping: YES  Alleviating factors: nothing really   Therapies tried and outcome: deep breaths       Labs reviewed in EPIC  BP Readings from Last 3 Encounters:   07/11/24 116/84   03/29/24 125/84   10/16/23 125/79    Wt Readings from Last 3 Encounters:   07/11/24 88.7 kg (195 lb 9.6 oz)   03/29/24 86.9 kg (191 lb 8 oz)   10/16/23 78 kg (172 lb)                  Patient Active Problem List   Diagnosis    History of abnormal cervical Pap smear    History of HPV infection    CARDIOVASCULAR SCREENING; LDL GOAL LESS THAN 160    Class 1 obesity due to excess calories without serious  comorbidity with body mass index (BMI) of 32.0 to 32.9 in adult    Dysplasia of cervix, low grade (IVETT 1)    Hypertriglyceridemia    PCOS (polycystic ovarian syndrome)    Dyslipidemia    Cervical cancer screening    Obesity (BMI 30-39.9)    Diabetes mellitus, type 2 (H)    Low HDL (under 40)    Brain aneurysm     Past Surgical History:   Procedure Laterality Date    BIOPSY      COLPOSCOPY CERVIX, BIOPSY CERVIX, ENDOCERVICAL CURETTAGE, COMBINED  06/24/2008 2011       Social History     Tobacco Use    Smoking status: Never    Smokeless tobacco: Never   Substance Use Topics    Alcohol use: Yes     Comment: Rarely     Family History   Problem Relation Age of Onset    Hyperlipidemia Mother     Hypertension Mother     Hypertension Father     Hyperlipidemia Brother     Diabetes Maternal Grandmother     Ovarian Cancer Maternal Grandmother     Other Cancer Maternal Grandmother         Overian    Coronary Artery Disease Maternal Grandfather     Hyperlipidemia Brother     Cerebrovascular Disease No family hx of     Thyroid Disease No family hx of     Breast Cancer No family hx of     Uterine Cancer No family hx of     Prostate Cancer No family hx of     Colorectal Cancer No family hx of     Pancreatic Cancer No family hx of     Lung Cancer No family hx of     Melanoma No family hx of     Autoimmune Disease No family hx of          Current Outpatient Medications   Medication Sig Dispense Refill    desogestrel-ethinyl estradiol (ISIBLOOM) 0.15-30 MG-MCG tablet Take 1 tablet by mouth daily 84 tablet 4    lisinopril (ZESTRIL) 10 MG tablet Take 1 tablet (10 mg) by mouth daily 90 tablet 3    Semaglutide (RYBELSUS) 3 MG tablet TAKE ONE TABLET BY MOUTH EVERY DAY 90 tablet 1    triamcinolone (KENALOG) 0.1 % external ointment Apply topically 2 times daily 30 g 3     Allergies   Allergen Reactions    Amoxicillin Anaphylaxis    Penicillins Anaphylaxis    Metformin Diarrhea     Recent Labs   Lab Test 07/11/24  1009 03/29/24  0933  "10/16/23  1342 07/21/23  0727 03/30/23  0904 03/30/23  0904 04/25/22  0731 04/25/22  0731 03/29/21  0913 01/03/20  0829   A1C 6.3* 6.5*  --  6.1*  --  6.8*   < > 6.3*  --  5.2   LDL  --  92  --   --   --  60  --  68 Cannot estimate LDL when triglyceride exceeds 400 mg/dL  72 Cannot estimate LDL when triglyceride exceeds 400 mg/dL  122*   HDL  --  39*  --   --   --  27*  --  30* 32* 42*   TRIG  --  433*  --   --   --  717*  --  403* 464* 403*   ALT <5 5  --   --   --  13   < > 14 17  --    CR 0.80 0.71   < > 0.99*  --  0.76   < > 0.76 0.83 0.74   GFRESTIMATED >90 >90   < > 74  --  >90   < > >90 90 >90   GFRESTBLACK  --   --   --   --   --   --   --   --  >90 >90   POTASSIUM 4.6 4.6  --  4.2   < > 4.4   < > 4.2 4.2 4.3   TSH 3.08 2.91  --   --   --  2.88   < > 3.88 3.28 3.44    < > = values in this interval not displayed.        Review of Systems  Constitutional, neuro, ENT, endocrine, pulmonary, cardiac, gastrointestinal, genitourinary, musculoskeletal, integument and psychiatric systems are negative, except as otherwise noted.      Objective    /84 (BP Location: Right arm, Patient Position: Sitting, Cuff Size: Adult Large)   Pulse 101   Temp 98.4  F (36.9  C) (Oral)   Resp 18   Ht 1.651 m (5' 5\")   Wt 88.7 kg (195 lb 9.6 oz)   SpO2 98%   BMI 32.55 kg/m    Body mass index is 32.55 kg/m .  Wt Readings from Last 4 Encounters:   07/11/24 88.7 kg (195 lb 9.6 oz)   03/29/24 86.9 kg (191 lb 8 oz)   10/16/23 78 kg (172 lb)   10/16/23 78.2 kg (172 lb 8 oz)      Physical Exam   GENERAL: Patient is well nourished, well developed,in no apparent distress, non-toxic, in no respiratory distress and acyanotic, alert, cooperative, and well hydrated  EYES: Eyes grossly normal to inspection and conjunctivae and sclerae normal  HENT:ear canals and TM's normal and nose and mouth without ulcers or lesions   NECK:normal and supple  CARDIAC:regular rates and rhythm, no murmur, click or rub, and no irregular beats  without LE " edema bilaterally  RESP: normal respiratory rate and rhythm, lungs clear to auscultation  unlabored respirations, no intercostal retractions or accessory muscle use  ABD:soft, nontender, without hepatosplenomegaly or masses  SKIN: Skin color, texture, turgor normal. No rashes or lesions.  MS: extremities normal- no gross deformities noted, gait normal, and normal muscle tone  NEURO: Normal strength and tone, sensory exam grossly normal, mentation intact, and speech normal  PSYCH: Alert, oriented, thought content appropriate,mentation appears normal., affect and mood normal        Results for orders placed or performed in visit on 07/11/24   Comprehensive metabolic panel     Status: Abnormal   Result Value Ref Range    Sodium 136 135 - 145 mmol/L    Potassium 4.6 3.4 - 5.3 mmol/L    Carbon Dioxide (CO2) 22 22 - 29 mmol/L    Anion Gap 10 7 - 15 mmol/L    Urea Nitrogen 10.5 6.0 - 20.0 mg/dL    Creatinine 0.80 0.51 - 0.95 mg/dL    GFR Estimate >90 >60 mL/min/1.73m2    Calcium 9.1 8.6 - 10.0 mg/dL    Chloride 104 98 - 107 mmol/L    Glucose 114 (H) 70 - 99 mg/dL    Alkaline Phosphatase 44 40 - 150 U/L    AST 15 0 - 45 U/L    ALT <5 0 - 50 U/L    Protein Total 7.4 6.4 - 8.3 g/dL    Albumin 4.2 3.5 - 5.2 g/dL    Bilirubin Total 0.2 <=1.2 mg/dL   Hemoglobin A1c     Status: Abnormal   Result Value Ref Range    Hemoglobin A1C 6.3 (H) 0.0 - 5.6 %   TSH with free T4 reflex     Status: Normal   Result Value Ref Range    TSH 3.08 0.30 - 4.20 uIU/mL         Assessment & Plan     Palpitations  - ZIO PATCH MAIL OUT  - Echocardiogram Complete  - TSH with free T4 reflex  - Comprehensive metabolic panel  - Hemoglobin A1c  - TSH with free T4 reflex    Diabetes mellitus, type 2 (H)  foot care discussed and Podiatry visits discussed, annual eye examinations at Ophthalmology discussed, glycohemoglobin monitoring discussed, and long term diabetic complications discussed  Attempt to improve diet  Weight loss advised   Recheck in 6 months,  sooner should new symptoms or   problems arise.     - Comprehensive metabolic panel  - Hemoglobin A1c    Thyroid nodule  Will follow up and/or notify patient of  results via My Chart to determine further need for followup   - TSH with free T4 reflex  - US Thyroid      MED REC REQUIRED  Post Medication Reconciliation Status: discharge medications reconciled and changed, per note/orders    See Patient Instructions  Patient Instructions   PLAN:   1.   Symptomatic therapy suggested: Continue current medications as prescribed.     2.  Orders Placed This Encounter   Procedures    REVIEW OF HEALTH MAINTENANCE PROTOCOL ORDERS    US Thyroid    Comprehensive metabolic panel    Hemoglobin A1c    TSH with free T4 reflex    Echocardiogram Complete       3.  FURTHER TESTING:       - thyroid  ultrasound       - echocardiogram   Will follow up and/or notify patient of  results via My Chart to determine further need for followup    Patient needs to follow up in if no improvement,or sooner if worsening of symptoms or other symptoms develop.          Signed Electronically by: EMANI Olivas CNP

## 2024-07-11 NOTE — RESULT ENCOUNTER NOTE
Jayshree Gonzalez,    Attached are your test results.  -A1C (test of diabetes control the last 2-3 months) is at your goal. Please continue with your current plan.  Please contact us if you have any questions.    Balbina Armstrong, CNP

## 2024-07-11 NOTE — PATIENT INSTRUCTIONS
PLAN:   1.   Symptomatic therapy suggested: Continue current medications as prescribed.     2.  Orders Placed This Encounter   Procedures    REVIEW OF HEALTH MAINTENANCE PROTOCOL ORDERS    US Thyroid    Comprehensive metabolic panel    Hemoglobin A1c    TSH with free T4 reflex    Echocardiogram Complete       3.  FURTHER TESTING:       - thyroid  ultrasound       - echocardiogram   Will follow up and/or notify patient of  results via My Chart to determine further need for followup    Patient needs to follow up in if no improvement,or sooner if worsening of symptoms or other symptoms develop.

## 2024-07-12 NOTE — RESULT ENCOUNTER NOTE
Jayshree Gonzalez,    Attached are your test results.  -Liver and gallbladder tests (ALT,AST, Alk phos,bilirubin) are normal.  -Kidney function (GFR) is normal.  -Sodium is normal.  -Potassium is normal.  -Calcium is normal.  -Glucose is elevated due to your diabetes.  -TSH (thyroid stimulating hormone) level is normal which indicates normal thyroid function.   Please contact us if you have any questions.    Balbina Armstrong, CNP

## 2024-07-18 ENCOUNTER — ANCILLARY PROCEDURE (OUTPATIENT)
Dept: CARDIOLOGY | Facility: CLINIC | Age: 39
End: 2024-07-18
Attending: NURSE PRACTITIONER
Payer: COMMERCIAL

## 2024-07-18 DIAGNOSIS — R00.2 PALPITATIONS: ICD-10-CM

## 2024-07-18 LAB — LVEF ECHO: NORMAL

## 2024-07-18 PROCEDURE — 93306 TTE W/DOPPLER COMPLETE: CPT | Performed by: INTERNAL MEDICINE

## 2024-07-19 NOTE — RESULT ENCOUNTER NOTE
Jayshree Gonzalez,    Attached are your test results.  Your echo was normal which is reassuring    Please contact us if you have any questions.    Balbina Armstrong, CNP

## 2024-07-23 ENCOUNTER — ANCILLARY PROCEDURE (OUTPATIENT)
Dept: ULTRASOUND IMAGING | Facility: CLINIC | Age: 39
End: 2024-07-23
Attending: NURSE PRACTITIONER
Payer: COMMERCIAL

## 2024-07-23 DIAGNOSIS — E04.1 THYROID NODULE: ICD-10-CM

## 2024-07-23 PROCEDURE — 76536 US EXAM OF HEAD AND NECK: CPT | Performed by: STUDENT IN AN ORGANIZED HEALTH CARE EDUCATION/TRAINING PROGRAM

## 2024-07-23 NOTE — RESULT ENCOUNTER NOTE
Jayshree Gonzalez,    Attached are your test results.  Thyroid ultrasound shows nodule is stable which is reassuring    Please contact us if you have any questions.    Balbina Armstrong, CNP

## 2024-07-26 PROCEDURE — 93244 EXT ECG>48HR<7D REV&INTERPJ: CPT | Performed by: INTERNAL MEDICINE

## 2024-07-29 NOTE — RESULT ENCOUNTER NOTE
Jayshree Gonzalez,    Attached are your test results.  Your Holter did not show any significant abnormal heart rhythms which is reassuring    Please contact us if you have any questions.    Balbina Armstrong, CNP

## 2024-07-31 ENCOUNTER — NURSE TRIAGE (OUTPATIENT)
Dept: FAMILY MEDICINE | Facility: CLINIC | Age: 39
End: 2024-07-31
Payer: COMMERCIAL

## 2024-07-31 DIAGNOSIS — U07.1 INFECTION DUE TO 2019 NOVEL CORONAVIRUS: Primary | ICD-10-CM

## 2024-07-31 RX ORDER — FAMOTIDINE 20 MG/1
20 TABLET, FILM COATED ORAL
COMMUNITY

## 2024-07-31 RX ORDER — LORATADINE 10 MG/1
10 TABLET ORAL DAILY
COMMUNITY

## 2024-07-31 NOTE — TELEPHONE ENCOUNTER
RN COVID TREATMENT VISIT  07/31/24      The patient has been triaged and does not require a higher level of care.    Nancie Gonzalez  38 year old  Current weight? 190lbs    Has the patient been seen by a primary care provider at an [facility] or [facility] within the past two years? Yes.   Have you been in close proximity to/do you have a known exposure to a person with a confirmed case of influenza? No.     General treatment eligibility:  Date of positive COVID test (PCR or at home)?  7/31/24    Are you or have you been hospitalized for this COVID-19 infection? No.   Have you received monoclonal antibodies or antiviral treatment for COVID-19 since this positive test? No.   Do you have any of the following conditions that place you at risk of being very sick from COVID-19?   - Diabetes mellitus, type 1 and type 2  - Overweight or Obesity (BMI >85th percentile or BMI 25 or higher)  Yes, patient has at least one high risk condition as noted above.     Current COVID symptoms:   - fever or chills  - cough  - fatigue  - muscle or body aches  - headache  - sore throat  - congestion or runny nose  - nausea or vomiting  Yes. Patient has at least one symptom as selected.     How many days since symptoms started? 5 days or less. Established patient, 12 years or older weighing at least 88.2 lbs, who has symptoms that started in the past 5 days, has not been hospitalized nor received treatment already, and is at risk for being very sick from COVID-19.     Treatment eligibility by RN:  Are you currently pregnant or nursing? No  Do you have a clinically significant hypersensitivity to nirmatrelvir or ritonavir, or toxic epidermal necrolysis (TEN) or Walters-Doni Syndrome? No  Do you have a history of hepatitis, any hepatic impairment on the Problem List (such as Child-Cullen Class C, cirrhosis, fatty liver disease, alcoholic liver disease), or was the last liver lab (hepatic panel, ALT, AST, ALK Phos,  bilirubin) elevated in the past 6 months? No  Do you have any history of severe renal impairment (eGFR < 30mL/min)? No    Is patient eligible to continue? Yes, patient meets all eligibility requirements for the RN COVID treatment (as denoted by all no responses above).     Current Outpatient Medications   Medication Sig Dispense Refill    desogestrel-ethinyl estradiol (ISIBLOOM) 0.15-30 MG-MCG tablet Take 1 tablet by mouth daily 84 tablet 4    lisinopril (ZESTRIL) 10 MG tablet Take 1 tablet (10 mg) by mouth daily 90 tablet 3    Semaglutide (RYBELSUS) 3 MG tablet TAKE ONE TABLET BY MOUTH EVERY DAY 90 tablet 1    triamcinolone (KENALOG) 0.1 % external ointment Apply topically 2 times daily 30 g 3       Medications from List 1 of the standing order (on medications that exclude the use of Paxlovid) that patient is taking: NONE. Is patient taking Moody's Wort? No  Is patient taking [place]'s Wort or any meds from List 1? No.   Medications from List 2 of the standing order (on meds that provider needs to adjust) that patient is taking: NONE. Is patient on any of the meds from List 2? No.   Medications from List 3 of standing order (on meds that a RN needs to adjust) that patient is taking: ethinyl estradiol/ethinyl estradiol containing meds (most common - Apri,Aviane, Ortho Cyclen, Ortho Tri Cyclen, Sprintec, Tri-Sprintec, Cryselle): Instructed patient to use a back-up method of birth control or abstain from intercourse while on Paxlovid until both one month after completion of Paxlovid and a new pill pack starts.  Is patient on any meds from List 3? Yes. Patient is on meds from list 3. No meds require a provider visit and at least one med required RN to adjust.     Paxlovid has an approximate 90% reduction in hospitalization. Paxlovid can possibly cause altered sense of taste, diarrhea (loose, watery stools), high blood pressure, muscle aches.     Would patient like a Paxlovid prescription?   Yes.   Lab Results    Component Value Date    GFRESTIMATED >90 07/11/2024       Was last eGFR reduced? No, eGFR 60 or greater/ No Result on record. Patient can receive the normal renal function dose. Paxlovid Rx sent to [place] pharmacy   [facility] [phone] [id number]  [place] [address]    Hours:  Mon-Fri: 9:00a - 5:00p    Curbside Pickup available    Temporary change to home medications: ethinyl estradiol/ethinyl estradiol containing meds (most common - Apri,Aviane, Ortho Cyclen, Ortho Tri Cyclen, Sprintec, Tri-Sprintec, Cryselle): Instructed patient to use a back-up method of birth control or abstain from intercourse while on Paxlovid until both one month after completion of Paxlovid and a new pill pack starts.     All medication adjustments (holds, etc) were discussed with the patient and patient was asked to repeat back (teachback) their med adjustment.  Did patient understand med adjustment? Yes, patient repeated back and understood correctly.      Reviewed the following instructions with the patient:    Paxlovid (nimatrelvir and ritonavir)    How it works  Two medicines (nirmatrelvir and ritonavir) are taken together. They stop the virus from growing. Less amount of virus is easier for your body to fight.    How to take  Medicine comes in a daily container with both medicine tablets. Take by mouth twice daily (once in the morning, once at night) for 5 days.  The number of tablets to take varies by patient.  Don't chew or break capsules. Swallow whole.    When to take  Take as soon as possible after positive COVID-19 test result, and within 5 days of your first symptoms.    Possible side effects  Can cause altered sense of taste, diarrhea (loose, watery stools), high blood pressure, muscle aches.    Rena Ramirez, RN, BSN  -[facility]             Reason for Disposition   HIGH RISK patient (e.g., weak immune system, age > 64 years, obesity with BMI of 30 or higher, pregnant, chronic  lung disease or other chronic medical condition) and COVID symptoms (e.g., cough, fever)  (Exceptions: Already seen by doctor or NP/PA and no new or worsening symptoms.)    Additional Information   Negative: SEVERE difficulty breathing (e.g., struggling for each breath, speaks in single words)   Negative: Difficult to awaken or acting confused (e.g., disoriented, slurred speech)   Negative: Bluish (or gray) lips or face now   Negative: Shock suspected (e.g., cold/pale/clammy skin, too weak to stand, low BP, rapid pulse)   Negative: Sounds like a life-threatening emergency to the triager   Negative: Diagnosed or suspected COVID-19 and symptoms lasting 3 or more weeks   Negative: COVID-19 exposure and no symptoms   Negative: COVID-19 vaccine reaction suspected (e.g., fever, headache, muscle aches) occurring 1 to 3 days after getting vaccine   Negative: COVID-19 vaccine, questions about   Negative: Lives with someone known to have influenza (flu test positive) and flu-like symptoms (e.g., cough, runny nose, sore throat, SOB; with or without fever)   Negative: Possible COVID-19 symptoms and triager concerned about severity of symptoms or other causes   Negative: COVID-19 and breastfeeding, questions about   Negative: SEVERE or constant chest pain or pressure  (Exception: Mild central chest pain, present only when coughing.)   Negative: MODERATE difficulty breathing (e.g., speaks in phrases, SOB even at rest, pulse 100-120)   Negative: Headache and stiff neck (can't touch chin to chest)   Negative: Oxygen level (e.g., pulse oximetry) 90% or lower   Negative: Chest pain or pressure  (Exception: MILD central chest pain, present only when coughing.)   Negative: Drinking very little and dehydration suspected (e.g., no urine > 12 hours, very dry mouth, very lightheaded)   Negative: Patient sounds very sick or weak to the triager   Negative: MILD difficulty breathing (e.g., minimal/no SOB at rest, SOB with walking, pulse  <100)   Negative: Fever > 103 F (39.4 C)   Negative: Fever > 101 F (38.3 C) and over 60 years of age   Negative: Fever > 100.0 F (37.8 C) and bedridden (e.g., CVA, chronic illness, recovering from surgery)    Protocols used: Coronavirus (COVID-19) Diagnosed or [id number]

## 2024-11-10 ENCOUNTER — HEALTH MAINTENANCE LETTER (OUTPATIENT)
Age: 39
End: 2024-11-10

## 2024-11-21 ENCOUNTER — MYC REFILL (OUTPATIENT)
Dept: FAMILY MEDICINE | Facility: CLINIC | Age: 39
End: 2024-11-21
Payer: COMMERCIAL

## 2024-11-21 ENCOUNTER — MYC MEDICAL ADVICE (OUTPATIENT)
Dept: FAMILY MEDICINE | Facility: CLINIC | Age: 39
End: 2024-11-21
Payer: COMMERCIAL

## 2024-11-21 DIAGNOSIS — Z30.41 ENCOUNTER FOR SURVEILLANCE OF CONTRACEPTIVE PILLS: ICD-10-CM

## 2024-11-21 DIAGNOSIS — E11.69 TYPE 2 DIABETES MELLITUS WITH OTHER SPECIFIED COMPLICATION, WITHOUT LONG-TERM CURRENT USE OF INSULIN (H): ICD-10-CM

## 2024-11-21 RX ORDER — DESOGESTREL AND ETHINYL ESTRADIOL 0.15-0.03
1 KIT ORAL DAILY
Qty: 84 TABLET | Refills: 4 | OUTPATIENT
Start: 2024-11-21

## 2024-11-21 RX ORDER — ORAL SEMAGLUTIDE 3 MG/1
3 TABLET ORAL DAILY
Qty: 90 TABLET | Refills: 0 | Status: SHIPPED | OUTPATIENT
Start: 2024-11-21

## 2024-11-22 NOTE — TELEPHONE ENCOUNTER
Routing [employer] message to provider, please review and advise. Prescription pended and pharmacy information updated. Antonio Elizalde RN, BSN

## 2024-11-23 RX ORDER — DESOGESTREL AND ETHINYL ESTRADIOL 0.15-0.03
1 KIT ORAL DAILY
Qty: 84 TABLET | Refills: 2 | Status: SHIPPED | OUTPATIENT
Start: 2024-11-23

## 2025-02-12 DIAGNOSIS — Z13.0 SCREENING FOR DISORDER OF BLOOD AND BLOOD-FORMING ORGANS: ICD-10-CM

## 2025-02-12 DIAGNOSIS — Z13.29 SCREENING FOR THYROID DISORDER: ICD-10-CM

## 2025-02-12 DIAGNOSIS — E11.69 TYPE 2 DIABETES MELLITUS WITH OTHER SPECIFIED COMPLICATION, WITHOUT LONG-TERM CURRENT USE OF INSULIN (H): Primary | ICD-10-CM

## 2025-02-12 DIAGNOSIS — Z13.6 CARDIOVASCULAR SCREENING; LDL GOAL LESS THAN 100: ICD-10-CM

## 2025-02-12 DIAGNOSIS — E28.2 PCOS (POLYCYSTIC OVARIAN SYNDROME): ICD-10-CM

## 2025-02-12 DIAGNOSIS — I10 ESSENTIAL HYPERTENSION WITH GOAL BLOOD PRESSURE LESS THAN 130/80: ICD-10-CM

## 2025-02-12 RX ORDER — ORAL SEMAGLUTIDE 3 MG/1
TABLET ORAL DAILY
Qty: 90 TABLET | Refills: 0 | Status: SHIPPED | OUTPATIENT
Start: 2025-02-12

## 2025-02-13 NOTE — TELEPHONE ENCOUNTER
Appointments in Next Year      Mar 31, 2025 7:30 AM  (Arrive by 7:10 AM)  Preventative Adult Visit with EMANI Olivas CNP  [facility] ([facility] ) [phone]

## 2025-03-02 ENCOUNTER — HEALTH MAINTENANCE LETTER (OUTPATIENT)
Age: 40
End: 2025-03-02

## 2025-03-26 SDOH — HEALTH STABILITY: PHYSICAL HEALTH: ON AVERAGE, HOW MANY MINUTES DO YOU ENGAGE IN EXERCISE AT THIS LEVEL?: 20 MIN

## 2025-03-26 SDOH — HEALTH STABILITY: PHYSICAL HEALTH: ON AVERAGE, HOW MANY DAYS PER WEEK DO YOU ENGAGE IN MODERATE TO STRENUOUS EXERCISE (LIKE A BRISK WALK)?: 1 DAY

## 2025-03-26 ASSESSMENT — SOCIAL DETERMINANTS OF HEALTH (SDOH): HOW OFTEN DO YOU GET TOGETHER WITH FRIENDS OR RELATIVES?: ONCE A WEEK

## 2025-03-31 ENCOUNTER — OFFICE VISIT (OUTPATIENT)
Dept: FAMILY MEDICINE | Facility: CLINIC | Age: 40
End: 2025-03-31
Attending: NURSE PRACTITIONER
Payer: COMMERCIAL

## 2025-03-31 VITALS
DIASTOLIC BLOOD PRESSURE: 87 MMHG | SYSTOLIC BLOOD PRESSURE: 128 MMHG | WEIGHT: 195.06 LBS | BODY MASS INDEX: 32.5 KG/M2 | OXYGEN SATURATION: 99 % | HEIGHT: 65 IN | HEART RATE: 102 BPM | RESPIRATION RATE: 18 BRPM | TEMPERATURE: 98.1 F

## 2025-03-31 DIAGNOSIS — Z13.29 SCREENING FOR THYROID DISORDER: ICD-10-CM

## 2025-03-31 DIAGNOSIS — E11.69 TYPE 2 DIABETES MELLITUS WITH OTHER SPECIFIED COMPLICATION, WITHOUT LONG-TERM CURRENT USE OF INSULIN (H): ICD-10-CM

## 2025-03-31 DIAGNOSIS — I10 ESSENTIAL HYPERTENSION WITH GOAL BLOOD PRESSURE LESS THAN 130/80: ICD-10-CM

## 2025-03-31 DIAGNOSIS — Z23 ENCOUNTER FOR ADMINISTRATION OF VACCINE: ICD-10-CM

## 2025-03-31 DIAGNOSIS — Z30.41 ENCOUNTER FOR SURVEILLANCE OF CONTRACEPTIVE PILLS: ICD-10-CM

## 2025-03-31 DIAGNOSIS — E66.09 CLASS 1 OBESITY DUE TO EXCESS CALORIES WITHOUT SERIOUS COMORBIDITY WITH BODY MASS INDEX (BMI) OF 32.0 TO 32.9 IN ADULT: ICD-10-CM

## 2025-03-31 DIAGNOSIS — Z13.6 CARDIOVASCULAR SCREENING; LDL GOAL LESS THAN 100: ICD-10-CM

## 2025-03-31 DIAGNOSIS — E28.2 PCOS (POLYCYSTIC OVARIAN SYNDROME): ICD-10-CM

## 2025-03-31 DIAGNOSIS — E66.811 CLASS 1 OBESITY DUE TO EXCESS CALORIES WITHOUT SERIOUS COMORBIDITY WITH BODY MASS INDEX (BMI) OF 32.0 TO 32.9 IN ADULT: ICD-10-CM

## 2025-03-31 DIAGNOSIS — Z13.0 SCREENING FOR DISORDER OF BLOOD AND BLOOD-FORMING ORGANS: ICD-10-CM

## 2025-03-31 DIAGNOSIS — Z00.00 ROUTINE GENERAL MEDICAL EXAMINATION AT A HEALTH CARE FACILITY: Primary | ICD-10-CM

## 2025-03-31 DIAGNOSIS — R06.02 EXERTIONAL SHORTNESS OF BREATH: ICD-10-CM

## 2025-03-31 LAB
ALBUMIN SERPL BCG-MCNC: 4.1 G/DL (ref 3.5–5.2)
ALP SERPL-CCNC: 55 U/L (ref 40–150)
ALT SERPL W P-5'-P-CCNC: 7 U/L (ref 0–50)
ANION GAP SERPL CALCULATED.3IONS-SCNC: 11 MMOL/L (ref 7–15)
AST SERPL W P-5'-P-CCNC: 22 U/L (ref 0–45)
BILIRUB SERPL-MCNC: 0.2 MG/DL
BUN SERPL-MCNC: 10.9 MG/DL (ref 6–20)
CALCIUM SERPL-MCNC: 9 MG/DL (ref 8.8–10.4)
CHLORIDE SERPL-SCNC: 101 MMOL/L (ref 98–107)
CHOLEST SERPL-MCNC: 175 MG/DL
CREAT SERPL-MCNC: 0.82 MG/DL (ref 0.51–0.95)
CREAT UR-MCNC: 121 MG/DL
EGFRCR SERPLBLD CKD-EPI 2021: >90 ML/MIN/1.73M2
ERYTHROCYTE [DISTWIDTH] IN BLOOD BY AUTOMATED COUNT: 12.2 % (ref 10–15)
EST. AVERAGE GLUCOSE BLD GHB EST-MCNC: 154 MG/DL
FASTING STATUS PATIENT QL REPORTED: YES
FASTING STATUS PATIENT QL REPORTED: YES
GLUCOSE SERPL-MCNC: 171 MG/DL (ref 70–99)
HBA1C MFR BLD: 7 % (ref 0–5.6)
HCO3 SERPL-SCNC: 21 MMOL/L (ref 22–29)
HCT VFR BLD AUTO: 41 % (ref 35–47)
HDLC SERPL-MCNC: 35 MG/DL
HGB BLD-MCNC: 13.7 G/DL (ref 11.7–15.7)
LDLC SERPL CALC-MCNC: ABNORMAL MG/DL
LDLC SERPL DIRECT ASSAY-MCNC: 59 MG/DL
MCH RBC QN AUTO: 28 PG (ref 26.5–33)
MCHC RBC AUTO-ENTMCNC: 33.4 G/DL (ref 31.5–36.5)
MCV RBC AUTO: 84 FL (ref 78–100)
MICROALBUMIN UR-MCNC: <12 MG/L
MICROALBUMIN/CREAT UR: NORMAL MG/G{CREAT}
NONHDLC SERPL-MCNC: 140 MG/DL
PLATELET # BLD AUTO: 231 10E3/UL (ref 150–450)
POTASSIUM SERPL-SCNC: 5.1 MMOL/L (ref 3.4–5.3)
PROT SERPL-MCNC: 7.1 G/DL (ref 6.4–8.3)
RBC # BLD AUTO: 4.9 10E6/UL (ref 3.8–5.2)
SODIUM SERPL-SCNC: 133 MMOL/L (ref 135–145)
TRIGL SERPL-MCNC: 508 MG/DL
TSH SERPL DL<=0.005 MIU/L-ACNC: 3.19 UIU/ML (ref 0.3–4.2)
WBC # BLD AUTO: 7.9 10E3/UL (ref 4–11)

## 2025-03-31 PROCEDURE — 83721 ASSAY OF BLOOD LIPOPROTEIN: CPT | Mod: 59 | Performed by: NURSE PRACTITIONER

## 2025-03-31 PROCEDURE — 90471 IMMUNIZATION ADMIN: CPT | Performed by: NURSE PRACTITIONER

## 2025-03-31 PROCEDURE — 99396 PREV VISIT EST AGE 40-64: CPT | Mod: 25 | Performed by: NURSE PRACTITIONER

## 2025-03-31 PROCEDURE — 83036 HEMOGLOBIN GLYCOSYLATED A1C: CPT | Performed by: NURSE PRACTITIONER

## 2025-03-31 PROCEDURE — 99214 OFFICE O/P EST MOD 30 MIN: CPT | Mod: 25 | Performed by: NURSE PRACTITIONER

## 2025-03-31 PROCEDURE — 90472 IMMUNIZATION ADMIN EACH ADD: CPT | Performed by: NURSE PRACTITIONER

## 2025-03-31 PROCEDURE — 90677 PCV20 VACCINE IM: CPT | Performed by: NURSE PRACTITIONER

## 2025-03-31 PROCEDURE — 82570 ASSAY OF URINE CREATININE: CPT | Performed by: NURSE PRACTITIONER

## 2025-03-31 PROCEDURE — 90746 HEPB VACCINE 3 DOSE ADULT IM: CPT | Performed by: NURSE PRACTITIONER

## 2025-03-31 PROCEDURE — 80061 LIPID PANEL: CPT | Performed by: NURSE PRACTITIONER

## 2025-03-31 PROCEDURE — 85027 COMPLETE CBC AUTOMATED: CPT | Performed by: NURSE PRACTITIONER

## 2025-03-31 PROCEDURE — 80053 COMPREHEN METABOLIC PANEL: CPT | Performed by: NURSE PRACTITIONER

## 2025-03-31 PROCEDURE — 36415 COLL VENOUS BLD VENIPUNCTURE: CPT | Performed by: NURSE PRACTITIONER

## 2025-03-31 PROCEDURE — G2211 COMPLEX E/M VISIT ADD ON: HCPCS | Mod: 25 | Performed by: NURSE PRACTITIONER

## 2025-03-31 PROCEDURE — 82043 UR ALBUMIN QUANTITATIVE: CPT | Performed by: NURSE PRACTITIONER

## 2025-03-31 PROCEDURE — 84443 ASSAY THYROID STIM HORMONE: CPT | Performed by: NURSE PRACTITIONER

## 2025-03-31 PROCEDURE — 99207 PR FOOT EXAM NO CHARGE: CPT | Performed by: NURSE PRACTITIONER

## 2025-03-31 RX ORDER — LISINOPRIL 10 MG/1
10 TABLET ORAL DAILY
Qty: 90 TABLET | Refills: 3 | Status: CANCELLED | OUTPATIENT
Start: 2025-03-31

## 2025-03-31 RX ORDER — DESOGESTREL AND ETHINYL ESTRADIOL 0.15-0.03
1 KIT ORAL DAILY
Qty: 84 TABLET | Refills: 4 | Status: SHIPPED | OUTPATIENT
Start: 2025-03-31

## 2025-03-31 RX ORDER — LISINOPRIL 10 MG/1
10 TABLET ORAL DAILY
Qty: 90 TABLET | Refills: 3 | Status: SHIPPED | OUTPATIENT
Start: 2025-03-31

## 2025-03-31 RX ORDER — ORAL SEMAGLUTIDE 3 MG/1
TABLET ORAL DAILY
Qty: 90 TABLET | Refills: 0 | Status: CANCELLED | OUTPATIENT
Start: 2025-03-31

## 2025-03-31 RX ORDER — DESOGESTREL AND ETHINYL ESTRADIOL 0.15-0.03
1 KIT ORAL DAILY
Qty: 84 TABLET | Refills: 2 | Status: CANCELLED | OUTPATIENT
Start: 2025-03-31

## 2025-03-31 ASSESSMENT — PAIN SCALES - GENERAL: PAINLEVEL_OUTOF10: NO PAIN (0)

## 2025-03-31 NOTE — RESULT ENCOUNTER NOTE
Jayshree Gonzalez,    Attached are your test results.  -Normal red blood cell (hgb) levels, normal white blood cell count and normal platelet levels.  -A1C (test of diabetes control the last 2-3 months) is mildly above your goal. We will increase your Rybelsus and then Please recheck your A1C test in 3 months.    Please contact us if you have any questions.    Balbina Armstrong, CNP

## 2025-03-31 NOTE — PROGRESS NOTES
"Preventive Care Visit  [facility]  Balbina EMANI Drew CNP, Family Medicine  Mar 31, 2025  {Provider  Link to [facility] :[id number]}    Assessment & Plan     Routine general medical examination at a health care facility  ***  - REVIEW OF HEALTH MAINTENANCE PROTOCOL ORDERS    Type 2 diabetes mellitus with other specified complication, without long-term current use of insulin (H)  ***  - Albumin Random Urine Quantitative with Creat Ratio  - Comprehensive metabolic panel  - Hemoglobin A1c    Essential hypertension with goal blood pressure less than 130/80  ***  - Albumin Random Urine Quantitative with Creat Ratio  - Comprehensive metabolic panel  - lisinopril (ZESTRIL) 10 MG tablet  Dispense: 90 tablet; Refill: 3    Class 1 obesity due to excess calories without serious comorbidity with body mass index (BMI) of 32.0 to 32.9 in adult  ***    Encounter for surveillance of contraceptive pills  ***  - desogestrel-ethinyl estradiol (ISIBLOOM) 0.15-30 MG-MCG tablet  Dispense: 84 tablet; Refill: 4    CARDIOVASCULAR SCREENING; LDL GOAL LESS THAN 100  ***  - Lipid panel reflex to direct LDL Fasting    Screening for disorder of blood and blood-forming organs  ***  - CBC with platelets    Screening for thyroid disorder  ***  - TSH with free T4 reflex    PCOS (polycystic ovarian syndrome)  ***  - Hemoglobin A1c      Patient has been advised of split billing requirements and indicates understanding: Yes        BMI  Estimated body mass index is 32.97 kg/m  as calculated from the following:    Height as of this encounter: 1.638 m (5' 4.5\").    Weight as of this encounter: 88.5 kg (195 lb 1 oz).   Weight management plan: Discussed healthy diet and exercise guidelines    Counseling  Appropriate preventive services were addressed with this patient via screening, questionnaire, or discussion as appropriate for fall prevention, nutrition, physical activity, Tobacco-use cessation, social engagement, weight loss " and cognition.  Checklist reviewing preventive services available has been given to the patient.  Reviewed patient's diet, addressing concerns and/or questions.   She is at risk for lack of exercise and has been provided with information to increase physical activity for the benefit of her well-being.   She is at risk for psychosocial distress and has been provided with information to reduce risk.       FUTURE APPOINTMENTS:       - Follow-up for annual visit or as needed  See Patient Instructions    Elissa Canada is a 39 year old, presenting for the following:  Physical and Imm/Inj (Hep B and Prevnar 20)        3/31/2025     7:15 AM   Additional Questions   Roomed by Cheryl NIEVES   Accompanied by self        Via the Health Maintenance questionnaire, the patient has reported the following services have been completed -Eye Exam: [facility] ([facility]) 2024-10-11, this information has been sent to the abstraction team.    Healthy Habits:     Getting at least 3 servings of Calcium per day:  Yes    Bi-annual eye exam:  NO    Dental care twice a year:  Yes    Sleep apnea or symptoms of sleep apnea:  None    Diet:  Vegetarian/vegan    Frequency of exercise:  1 day/week    Duration of exercise:  15-30 minutes    Taking medications regularly:  Yes    Barriers to taking medications:  None    Medication side effects:  None    Additional concerns today:  Yes (After covid concerns (SOB etc..)  Imm/Inj      ***     Diabetes Follow-up    How often are you checking your blood sugar? A few times a month  What time of day are you checking your blood sugars (select all that apply)?  Before meals  Have you had any blood sugars above 200?  No  Have you had any blood sugars below 70?  No  What symptoms do you notice when your blood sugar is low?  None  What concerns do you have today about your diabetes? Other: weight gain    Do you have any of these symptoms? (Select all that apply)  Weight gain and No numbness or tingling  in feet.  No redness, sores or blisters on feet.  No complaints of excessive thirst.  No reports of blurry vision.  No significant changes to weight.  Has been on the Rybelsus for about a year       BP Readings from Last 2 Encounters:   03/31/25 128/87   07/11/24 116/84     Hemoglobin A1C (%)   Date Value   07/11/2024 6.3 (H)   03/29/2024 6.5 (H)   01/03/2020 5.2   01/04/2019 5.8 (H)     LDL Cholesterol Calculated   Date Value   03/29/2024      Comment:     Cannot estimate LDL when triglyceride exceeds 400 mg/dL   03/30/2023      Comment:     Cannot estimate LDL when triglyceride exceeds 400 mg/dL   03/29/2021     Cannot estimate LDL when triglyceride exceeds 400 mg/dL mg/dL   01/03/2020     Cannot estimate LDL when triglyceride exceeds 400 mg/dL mg/dL     LDL Cholesterol Direct (mg/dL)   Date Value   03/29/2024 92   03/30/2023 60   04/25/2022 68   03/29/2021 72   01/03/2020 122 (H)       Hypertension Follow-up    Do you check your blood pressure regularly outside of the clinic? { :881772}   Are you following a low salt diet? { :403310}  Are your blood pressures ever more than 140 on the top number (systolic) OR more   than 90 on the bottom number (diastolic), for example 140/90? { :001046}  {additonal problems for provider to add (Optional):196418}  Advance Care Planning  Patient does not have a Health Care Directive: Discussed advance care planning with patient; however, patient declined at this time.      3/26/2025   General Health   How would you rate your overall physical health? Good   Feel stress (tense, anxious, or unable to sleep) Rather much   (!) STRESS CONCERN      3/26/2025   Nutrition   Three or more servings of calcium each day? Yes   Diet: Vegetarian/vegan   How many servings of fruit and vegetables per day? (!) 2-3   How many sweetened beverages each day? 0-1         3/26/2025   Exercise   Days per week of moderate/strenous exercise 1 day   Average minutes spent exercising at this level 20 min   (!)  EXERCISE CONCERN      3/26/2025   Social Factors   Frequency of gathering with friends or relatives Once a week   Worry food won't last until get money to buy more No   Food not last or not have enough money for food? No   Do you have housing? (Housing is defined as stable permanent housing and does not include staying ouside in a car, in a tent, in an abandoned building, in an overnight shelter, or couch-surfing.) Yes   Are you worried about losing your housing? No   Lack of transportation? No   Unable to get utilities (heat,electricity)? No         3/26/2025   Dental   Dentist two times every year? Yes           Today's PHQ-2 Score:       3/31/2025     7:01 AM   PHQ-2 ( 1999 [employer])   Q1: Little interest or pleasure in doing things 0   Q2: Feeling down, depressed or hopeless 1   PHQ-2 Score 1    Q1: Little interest or pleasure in doing things Not at all   Q2: Feeling down, depressed or hopeless Several days   PHQ-2 Score 1       Patient-reported           3/26/2025   Substance Use   Alcohol more than 3/day or more than 7/wk No   Do you use any other substances recreationally? No     Social History     Tobacco Use    Smoking status: Never    Smokeless tobacco: Never   Vaping Use    Vaping status: Never Used   Substance Use Topics    Alcohol use: Yes     Comment: Rarely    Drug use: No           4/1/2024   LAST FHS-7 RESULTS   1st degree relative breast or ovarian cancer No   Any relative bilateral breast cancer No   Any male have breast cancer No   Any ONE woman have BOTH breast AND ovarian cancer No   Any woman with breast cancer before 50yrs No   2 or more relatives with breast AND/OR ovarian cancer No   2 or more relatives with breast AND/OR bowel cancer No        Mammogram Screening - Mammogram every 1-2 years updated in Health Maintenance based on mutual decision making        3/26/2025   STI Screening   New sexual partner(s) since last STI/HIV test? No     History of abnormal Pap smear: YES - reflected in  Problem List and Health Maintenance accordingly        Latest Ref Rng & Units 3/29/2024     7:45 AM 1/4/2019     9:00 AM 12/29/2015     8:40 AM   PAP / HPV   PAP  Negative for Intraepithelial Lesion or Malignancy (NILM)      PAP (Historical)   NIL     HPV 16 DNA Negative Negative  Negative  Negative    HPV 18 DNA Negative Negative  Negative  Negative    Other HR HPV Negative Negative  Negative  Negative      ASCVD Risk   The 10-year ASCVD risk score (Romain LORA, et al., 2019) is: 3.4%    Values used to calculate the score:      Age: 39 years      Sex: Female      Is Non- : No      Diabetic: Yes      Tobacco smoker: No      Systolic Blood Pressure: 128 mmHg      Is BP treated: Yes      HDL Cholesterol: 39 mg/dL      Total Cholesterol: 212 mg/dL        3/26/2025   Contraception/Family Planning   Questions about contraception or family planning No        Reviewed and updated as needed this visit by Provider      Problems               Past Medical History:   Diagnosis Date    Cerebral aneurysm, nonruptured 5/7/2023    IVETT I (cervical intraepithelial neoplasia I)     Pt reported    Diabetes mellitus, type 2 (H) 5/9/2023    Essential hypertension with goal blood pressure less than 130/80 2/12/2025    Low HDL (under 40) 5/10/2023     Past Surgical History:   Procedure Laterality Date    BIOPSY      COLPOSCOPY CERVIX, BIOPSY CERVIX, ENDOCERVICAL CURETTAGE, COMBINED  06/24/2008 2011     Lab work is in process  Labs reviewed in EPIC  BP Readings from Last 3 Encounters:   03/31/25 128/87   07/11/24 116/84   03/29/24 125/84    Wt Readings from Last 3 Encounters:   03/31/25 88.5 kg (195 lb 1 oz)   07/11/24 88.7 kg (195 lb 9.6 oz)   03/29/24 86.9 kg (191 lb 8 oz)                  Patient Active Problem List   Diagnosis    History of abnormal cervical Pap smear    History of HPV infection    CARDIOVASCULAR SCREENING; LDL GOAL LESS THAN 160    Class 1 obesity due to excess calories without  serious comorbidity with body mass index (BMI) of 32.0 to 32.9 in adult    Dysplasia of cervix, low grade (IVETT 1)    Hypertriglyceridemia    PCOS (polycystic ovarian syndrome)    Dyslipidemia    Cervical cancer screening    Obesity (BMI 30-39.9)    Low HDL (under 40)    Brain aneurysm    Essential hypertension with goal blood pressure less than 130/80    Type 2 diabetes mellitus with other specified complication, without long-term current use of insulin (H)     Past Surgical History:   Procedure Laterality Date    BIOPSY      COLPOSCOPY CERVIX, BIOPSY CERVIX, ENDOCERVICAL CURETTAGE, COMBINED  06/24/2008 2011       Social History     Tobacco Use    Smoking status: Never    Smokeless tobacco: Never   Substance Use Topics    Alcohol use: Yes     Comment: Rarely     Family History   Problem Relation Age of Onset    Hyperlipidemia Mother     Hypertension Mother     Hypertension Father     Hyperlipidemia Brother     Diabetes Maternal Grandmother     Ovarian Cancer Maternal Grandmother     Other Cancer Maternal Grandmother         Overian    Coronary Artery Disease Maternal Grandfather     Hyperlipidemia Brother     Cerebrovascular Disease No family hx of     Thyroid Disease No family hx of     Breast Cancer No family hx of     Uterine Cancer No family hx of     Prostate Cancer No family hx of     Colorectal Cancer No family hx of     Pancreatic Cancer No family hx of     Lung Cancer No family hx of     Melanoma No family hx of     Autoimmune Disease No family hx of          Current Outpatient Medications   Medication Sig Dispense Refill    desogestrel-ethinyl estradiol (ISIBLOOM) 0.15-30 MG-MCG tablet Take 1 tablet by mouth daily. 84 tablet 4    famotidine (PEPCID) 20 MG tablet Take 20 mg by mouth nightly as needed (for heartburn)      lisinopril (ZESTRIL) 10 MG tablet Take 1 tablet (10 mg) by mouth daily. 90 tablet 3    loratadine (CLARITIN) 10 MG tablet Take 10 mg by mouth daily      RYBELSUS 3 MG tablet TAKE ONE  TABLET BY MOUTH EVERY DAY 90 tablet 0    triamcinolone (KENALOG) 0.1 % external ointment Apply topically 2 times daily 30 g 3     Allergies   Allergen Reactions    Amoxicillin Anaphylaxis    Penicillins Anaphylaxis    Metformin Diarrhea     Recent Labs   Lab Test 07/11/24  1009 03/29/24  0736 10/16/23  1342 07/21/23  0727 03/30/23  0904 03/30/23  0904 04/25/22  0731 04/25/22  0731 03/29/21  0913 01/03/20  0829   A1C 6.3* 6.5*  --  6.1*  --  6.8*   < > 6.3*  --  5.2   LDL  --  92  --   --   --  60  --  68 Cannot estimate LDL when triglyceride exceeds 400 mg/dL  72 Cannot estimate LDL when triglyceride exceeds 400 mg/dL  122*   HDL  --  39*  --   --   --  27*  --  30* 32* 42*   TRIG  --  433*  --   --   --  717*  --  403* 464* 403*   ALT <5 5  --   --   --  13   < > 14 17  --    CR 0.80 0.71   < > 0.99*  --  0.76   < > 0.76 0.83 0.74   GFRESTIMATED >90 >90   < > 74  --  >90   < > >90 90 >90   GFRESTBLACK  --   --   --   --   --   --   --   --  >90 >90   POTASSIUM 4.6 4.6  --  4.2   < > 4.4   < > 4.2 4.2 4.3   TSH 3.08 2.91  --   --   --  2.88   < > 3.88 3.28 3.44    < > = values in this interval not displayed.        CONSTITUTIONAL:POSITIVE  for weight gain and NEGATIVE  for {:324328}  INTEGUMENTARY/SKIN: NEGATIVE for worrisome rashes, moles or lesions  EYES: NEGATIVE for vision changes or irritation  ENT: NEGATIVE for ear, mouth and throat problems  RESP:POSITIVE for dyspnea on exertion and seems to be related to after Covid. Had seen pulmonology a few years ago  and NEGATIVE for hemoptysis, pleurisy, and wheezing  BREAST: NEGATIVE for masses, tenderness or discharge  CV: POSITIVE for dyspnea on exertion and NEGATIVE for {:899956}  GI: NEGATIVE for nausea, abdominal pain, heartburn, or change in bowel habits   female: normal menses, no unusual urinary symptoms, and no unusual vaginal symptoms  MUSCULOSKELETAL:POSITIVE  for joint pain intermittent for right knee and left shoulder. It is very intermittently  and  "NEGATIVE for joint swelling  and joint warmth   NEURO: POSITIVE for dizziness/lightheadedness and seems to be more positional  and NEGATIVE for {:194192}  ENDOCRINE: POSITIVE  for HX diabetes and weight gain and NEGATIVE for paresthesias, polydipsia, polyphagia, and polyuria  HEME/ALLERGY/IMMUNE: NEGATIVE for bleeding problems  PSYCHIATRIC: POSITIVE for stress but not unmanageable  and NEGATIVE for{:517498}          Objective    Exam  /87 (BP Location: Right arm, Patient Position: Sitting, Cuff Size: Adult Large)   Pulse 102   Temp 98.1  F (36.7  C) (Oral)   Resp 18   Ht 1.638 m (5' 4.5\")   Wt 88.5 kg (195 lb 1 oz)   LMP 02/10/2025   SpO2 99%   BMI 32.97 kg/m     Estimated body mass index is 32.97 kg/m  as calculated from the following:    Height as of this encounter: 1.638 m (5' 4.5\").    Weight as of this encounter: 88.5 kg (195 lb 1 oz).  Wt Readings from Last 4 Encounters:   03/31/25 88.5 kg (195 lb 1 oz)   07/11/24 88.7 kg (195 lb 9.6 oz)   03/29/24 86.9 kg (191 lb 8 oz)   10/16/23 78 kg (172 lb)         Physical Exam  {FEMALE - complete :531108}        Signed Electronically by: EMANI Olivas CNP    " "self harm          Objective    Exam  /87 (BP Location: Right arm, Patient Position: Sitting, Cuff Size: Adult Large)   Pulse 102   Temp 98.1  F (36.7  C) (Oral)   Resp 18   Ht 1.638 m (5' 4.5\")   Wt 88.5 kg (195 lb 1 oz)   LMP 02/10/2025   SpO2 99%   BMI 32.97 kg/m     Estimated body mass index is 32.97 kg/m  as calculated from the following:    Height as of this encounter: 1.638 m (5' 4.5\").    Weight as of this encounter: 88.5 kg (195 lb 1 oz).  Wt Readings from Last 4 Encounters:   03/31/25 88.5 kg (195 lb 1 oz)   07/11/24 88.7 kg (195 lb 9.6 oz)   03/29/24 86.9 kg (191 lb 8 oz)   10/16/23 78 kg (172 lb)         Physical Exam  GENERAL: alert and no distress  EYES: Eyes grossly normal to inspection and conjunctivae and sclerae normal  HENT: ear canals and TM's normal, nose and mouth without ulcers or lesions  NECK: no adenopathy, no asymmetry, masses, or scars  RESP: lungs clear to auscultation - no rales, rhonchi or wheezes  BREAST: normal without masses, tenderness or nipple discharge and no palpable axillary masses or adenopathy  CV: regular rates and rhythm, no murmur, click or rub, peripheral pulses strong, and no peripheral edema  ABDOMEN: soft, nontender, no hepatosplenomegaly, no masses and bowel sounds normal   (female): deferred  MS: no gross musculoskeletal defects noted, no edema  SKIN: no suspicious lesions or rashes  NEURO: Normal strength and tone, mentation intact and speech normal  PSYCH: mentation appears normal, affect normal/bright  LYMPH: no cervical, supraclavicular, axillary, or inguinal adenopathy  Diabetic foot exam: normal DP and PT pulses, no trophic changes or ulcerative lesions, and normal sensory exam    Results for orders placed or performed in visit on 03/31/25   Lipid panel reflex to direct LDL Fasting     Status: Abnormal   Result Value Ref Range    Cholesterol 175 <200 mg/dL    Triglycerides 508 (H) <150 mg/dL    Direct Measure HDL 35 (L) >=50 mg/dL    LDL " Cholesterol Calculated      Non HDL Cholesterol 140 (H) <130 mg/dL    Patient Fasting > 8hrs? Yes     Narrative    Cholesterol  Desirable: < 200 mg/dL  Borderline High: 200 - 239 mg/dL  High: >= 240 mg/dL    Triglycerides  Normal: < 150 mg/dL  Borderline High: 150 - 199 mg/dL  High: 200-499 mg/dL  Very High: >= 500 mg/dL    Direct Measure HDL  Female: >= 50 mg/dL   Male: >= 40 mg/dL    LDL Cholesterol  Desirable: < 100 mg/dL  Above Desirable: 100 - 129 mg/dL   Borderline High: 130 - 159 mg/dL   High:  160 - 189 mg/dL   Very High: >= 190 mg/dL    Non HDL Cholesterol  Desirable: < 130 mg/dL  Above Desirable: 130 - 159 mg/dL  Borderline High: 160 - 189 mg/dL  High: 190 - 219 mg/dL  Very High: >= 220 mg/dL   Albumin Random Urine Quantitative with Creat Ratio     Status: None   Result Value Ref Range    Creatinine Urine mg/dL 121.0 mg/dL    Albumin Urine mg/L <12.0 mg/L    Albumin Urine mg/g Cr     CBC with platelets     Status: Normal   Result Value Ref Range    WBC Count 7.9 4.0 - 11.0 10e3/uL    RBC Count 4.90 3.80 - 5.20 10e6/uL    Hemoglobin 13.7 11.7 - 15.7 g/dL    Hematocrit 41.0 35.0 - 47.0 %    MCV 84 78 - 100 fL    MCH 28.0 26.5 - 33.0 pg    MCHC 33.4 31.5 - 36.5 g/dL    RDW 12.2 10.0 - 15.0 %    Platelet Count 231 150 - 450 10e3/uL   Comprehensive metabolic panel     Status: Abnormal   Result Value Ref Range    Sodium 133 (L) 135 - 145 mmol/L    Potassium 5.1 3.4 - 5.3 mmol/L    Carbon Dioxide (CO2) 21 (L) 22 - 29 mmol/L    Anion Gap 11 7 - 15 mmol/L    Urea Nitrogen 10.9 6.0 - 20.0 mg/dL    Creatinine 0.82 0.51 - 0.95 mg/dL    GFR Estimate >90 >60 mL/min/1.73m2    Calcium 9.0 8.8 - 10.4 mg/dL    Chloride 101 98 - 107 mmol/L    Glucose 171 (H) 70 - 99 mg/dL    Alkaline Phosphatase 55 40 - 150 U/L    AST 22 0 - 45 U/L    ALT 7 0 - 50 U/L    Protein Total 7.1 6.4 - 8.3 g/dL    Albumin 4.1 3.5 - 5.2 g/dL    Bilirubin Total 0.2 <=1.2 mg/dL    Patient Fasting > 8hrs? Yes    TSH with free T4 reflex     Status:  Normal   Result Value Ref Range    TSH 3.19 0.30 - 4.20 uIU/mL   Hemoglobin A1c     Status: Abnormal   Result Value Ref Range    Estimated Average Glucose 154 (H) <117 mg/dL    Hemoglobin A1C 7.0 (H) 0.0 - 5.6 %   LDL cholesterol direct     Status: Normal   Result Value Ref Range    LDL Cholesterol Direct 59 <100 mg/dL         Signed Electronically by: EMANI Olivas CNP

## 2025-03-31 NOTE — PROGRESS NOTES
Prior to immunization administration, verified patients identity using patient s name and date of birth. Please see Immunization Activity for additional information.     Screening Questionnaire for Adult Immunization    Are you sick today?   No   Do you have allergies to medications, food, a vaccine component or latex?   No   Have you ever had a serious reaction after receiving a vaccination?   No   Do you have a long-term health problem with heart, lung, kidney, or metabolic disease (e.g., diabetes), asthma, a blood disorder, no spleen, complement component deficiency, a cochlear implant, or a spinal fluid leak?  Are you on long-term aspirin therapy?   Yes   Do you have cancer, leukemia, HIV/AIDS, or any other immune system problem?   No   Do you have a parent, brother, or sister with an immune system problem?   No   In the past 3 months, have you taken medications that affect  your immune system, such as prednisone, other steroids, or anticancer drugs; drugs for the treatment of rheumatoid arthritis, Crohn s disease, or psoriasis; or have you had radiation treatments?   No   Have you had a seizure, or a brain or other nervous system problem?   No   During the past year, have you received a transfusion of blood or blood    products, or been given immune (gamma) globulin or antiviral drug?   No   For women: Are you pregnant or is there a chance you could become       pregnant during the next month?   No   Have you received any vaccinations in the past 4 weeks?   No     Immunization questionnaire answers were all negative. Ordered and reviewed by Balbina Armstrong CNP       Patient instructed to remain in clinic for 15 minutes afterwards, and to report any adverse reactions.     Screening performed by Cheryl Valiente MA on 3/31/2025 at 8:28 AM.

## 2025-03-31 NOTE — PATIENT INSTRUCTIONS
PLAN:   1.   Symptomatic therapy suggested: increase Rybelsus to 7 mg   Increase calcium to 1000mg and 1000 international unit(s) Vit D daily   2.  Orders Placed This Encounter   Medications    desogestrel-ethinyl estradiol (ISIBLOOM) 0.15-30 MG-MCG tablet     Sig: Take 1 tablet by mouth daily.     Dispense:  84 tablet     Refill:  4    lisinopril (ZESTRIL) 10 MG tablet     Sig: Take 1 tablet (10 mg) by mouth daily.     Dispense:  90 tablet     Refill:  3    Semaglutide (RYBELSUS) 7 MG tablet     Sig: Take 1 tablet (7 mg) by mouth daily.     Dispense:  90 tablet     Refill:  1     Orders Placed This Encounter   Procedures    REVIEW OF HEALTH MAINTENANCE PROTOCOL ORDERS    CT Chest w/o Contrast       3.  FURTHER TESTING:       - mammogram  Work on weight loss  Regular exercise  Will follow up and/or notify patient of  results via My Chart to determine further need for followup   Follow up office visit in one year for annual health maintenance exam, sooner PRN.   Patient needs to follow up in if no improvement,or sooner if worsening of symptoms or other symptoms develop.          Patient Education   Preventive Care Advice   This is general advice given by our system to help you stay healthy. However, your care team may have specific advice just for you. Please talk to your care team about your preventive care needs.  Nutrition  Eat 5 or more servings of fruits and vegetables each day.  Try wheat bread, brown rice and whole grain pasta (instead of white bread, rice, and pasta).  Get enough calcium and vitamin D. Check the label on foods and aim for 100% of the RDA (recommended daily allowance).  Lifestyle  Exercise at least 150 minutes each week  (30 minutes a day, 5 days a week).  Do muscle strengthening activities 2 days a week. These help control your weight and prevent disease.  No smoking.  Wear sunscreen to prevent skin cancer.  Have a dental exam and cleaning every 6 months.  Yearly exams  See your health care  team every year to talk about:  Any changes in your health.  Any medicines your care team has prescribed.  Preventive care, family planning, and ways to prevent chronic diseases.  Shots (vaccines)   HPV shots (up to age 26), if you've never had them before.  Hepatitis B shots (up to age 59), if you've never had them before.  COVID-19 shot: Get this shot when it's due.  Flu shot: Get a flu shot every year.  Tetanus shot: Get a tetanus shot every 10 years.  Pneumococcal, hepatitis A, and RSV shots: Ask your care team if you need these based on your risk.  Shingles shot (for age 50 and up)  General health tests  Diabetes screening:  Starting at age 35, Get screened for diabetes at least every 3 years.  If you are younger than age 35, ask your care team if you should be screened for diabetes.  Cholesterol test: At age 39, start having a cholesterol test every 5 years, or more often if advised.  Bone density scan (DEXA): At age 50, ask your care team if you should have this scan for osteoporosis (brittle bones).  Hepatitis C: Get tested at least once in your life.  STIs (sexually transmitted infections)  Before age 24: Ask your care team if you should be screened for STIs.  After age 24: Get screened for STIs if you're at risk. You are at risk for STIs (including HIV) if:  You are sexually active with more than one person.  You don't use condoms every time.  You or a partner was diagnosed with a sexually transmitted infection.  If you are at risk for HIV, ask about PrEP medicine to prevent HIV.  Get tested for HIV at least once in your life, whether you are at risk for HIV or not.  Cancer screening tests  Cervical cancer screening: If you have a cervix, begin getting regular cervical cancer screening tests starting at age 21.  Breast cancer scan (mammogram): If you've ever had breasts, begin having regular mammograms starting at age 40. This is a scan to check for breast cancer.  Colon cancer screening: It is important  to start screening for colon cancer at age 45.  Have a colonoscopy test every 10 years (or more often if you're at risk) Or, ask your provider about stool tests like a FIT test every year or Cologuard test every 3 years.  To learn more about your testing options, visit:   .  For help making a decision, visit:   https://bit.ly/my58219.  Prostate cancer screening test: If you have a prostate, ask your care team if a prostate cancer screening test (PSA) at age 55 is right for you.  Lung cancer screening: If you are a current or former smoker ages 50 to 80, ask your care team if ongoing lung cancer screenings are right for you.  For informational purposes only. Not to replace the advice of your health care provider. Copyright   2023 [employer]. All rights reserved. Clinically reviewed by the [facility]. [employer] 432361 - REV 01/24.  Learning About Stress  What is stress?     Stress is your body's response to a hard situation. Your body can have a physical, emotional, or mental response. Stress is a fact of life for most people, and it affects everyone differently. What causes stress for you may not be stressful for someone else.  A lot of things can cause stress. You may feel stress when you go on a job interview, take a test, or run a race. This kind of short-term stress is normal and even useful. It can help you if you need to work hard or react quickly. For example, stress can help you finish an important job on time.  Long-term stress is caused by ongoing stressful situations or events. Examples of long-term stress include long-term health problems, ongoing problems at work, or conflicts in your family. Long-term stress can harm your health.  How does stress affect your health?  When you are stressed, your body responds as though you are in danger. It makes hormones that speed up your heart, make you breathe faster, and give you a burst of energy. This is called the  fight-or-flight stress response. If the stress is over quickly, your body goes back to normal and no harm is done.  But if stress happens too often or lasts too long, it can have bad effects. Long-term stress can make you more likely to get sick, and it can make symptoms of some diseases worse. If you tense up when you are stressed, you may develop neck, shoulder, or low back pain. Stress is linked to high blood pressure and heart disease.  Stress also harms your emotional health. It can make you piper, tense, or depressed. Your relationships may suffer, and you may not do well at work or school.  What can you do to manage stress?  You can try these things to help manage stress:   Do something active. Exercise or activity can help reduce stress. Walking is a great way to get started. Even everyday activities such as housecleaning or yard work can help.  Try yoga or alison chi. These techniques combine exercise and meditation. You may need some training at first to learn them.  Do something you enjoy. For example, listen to music or go to a movie. Practice your hobby or do volunteer work.  Meditate. This can help you relax, because you are not worrying about what happened before or what may happen in the future.  Do guided imagery. Imagine yourself in any setting that helps you feel calm. You can use online videos, books, or a teacher to guide you.  Do breathing exercises. For example:  From a standing position, bend forward from the waist with your knees slightly bent. Let your arms dangle close to the floor.  Breathe in slowly and deeply as you return to a standing position. Roll up slowly and lift your head last.  Hold your breath for just a few seconds in the standing position.  Breathe out slowly and bend forward from the waist.  Let your feelings out. Talk, laugh, cry, and express anger when you need to. Talking with supportive friends or family, a counselor, or a екатерина leader about your feelings is a healthy way  "to relieve stress. Avoid discussing your feelings with people who make you feel worse.  Write. It may help to write about things that are bothering you. This helps you find out how much stress you feel and what is causing it. When you know this, you can find better ways to cope.  What can you do to prevent stress?  You might try some of these things to help prevent stress:  Manage your time. This helps you find time to do the things you want and need to do.  Get enough sleep. Your body recovers from the stresses of the day while you are sleeping.  Get support. Your family, friends, and community can make a difference in how you experience stress.  Limit your news feed. Avoid or limit time on social media or news that may make you feel stressed.  Do something active. Exercise or activity can help reduce stress. Walking is a great way to get started.  Where can you learn more?  Go to https://www.[employer].net/patiented  Enter N032 in the search box to learn more about \"Learning About Stress.\"  Current as of: October 24, 2024  Content Version: 14.4    5408-2938 "[employer]".   Care instructions adapted under license by your healthcare professional. If you have questions about a medical condition or this instruction, always ask your healthcare professional. "[employer]" disclaims any warranty or liability for your use of this information.       "

## 2025-04-03 ENCOUNTER — HOSPITAL ENCOUNTER (OUTPATIENT)
Dept: MAMMOGRAPHY | Facility: CLINIC | Age: 40
Discharge: HOME | End: 2025-04-03
Attending: NURSE PRACTITIONER
Payer: COMMERCIAL

## 2025-04-03 DIAGNOSIS — Z12.31 VISIT FOR SCREENING MAMMOGRAM: ICD-10-CM

## 2025-04-03 PROCEDURE — 77063 BREAST TOMOSYNTHESIS BI: CPT

## 2025-04-06 NOTE — RESULT ENCOUNTER NOTE
Jayshree Gonzalez,    Attached are your test results.  -LDL(bad) cholesterol level is elevated, HDL(good) cholesterol level is low which can increase your heart disease risk.  A diet high in fat and simple carbohydrates, genetics and being overweight can contribute to this. ADVISE: exercising 150 minutes of aerobic exercise per week (30 minutes for 5 days per week or 50 minutes for 3 days per week are options) and eating a low saturated fat/low carbohydrate diet are helpful to improve this. In 6 months, you should recheck your fasting cholesterol panel by scheduling a lab-only appointment.  -Liver and gallbladder tests (ALT,AST, Alk phos,bilirubin) are normal.  -Kidney function (GFR) is normal.  -Sodium is decreased.  ADVISE: recheck in 3 months .  -Potassium is normal.  -Calcium is normal.  -Glucose is elevated due to your diabetes.  -TSH (thyroid stimulating hormone) level is normal which indicates normal thyroid function.  -Microalbumin (urine protein) test is normal.  ADVISE: rechecking this annually.   Please contact us if you have any questions.    Balbina Armstrong, CNP

## 2025-04-08 ENCOUNTER — ANCILLARY PROCEDURE (OUTPATIENT)
Dept: CT IMAGING | Facility: CLINIC | Age: 40
End: 2025-04-08
Attending: NURSE PRACTITIONER
Payer: COMMERCIAL

## 2025-04-08 DIAGNOSIS — R06.02 EXERTIONAL SHORTNESS OF BREATH: ICD-10-CM

## 2025-04-08 PROCEDURE — 71250 CT THORAX DX C-: CPT | Performed by: RADIOLOGY

## 2025-05-02 ENCOUNTER — HOSPITAL ENCOUNTER (OUTPATIENT)
Dept: CARDIOLOGY | Facility: CLINIC | Age: 40
Discharge: HOME | End: 2025-05-02
Attending: NURSE PRACTITIONER | Admitting: NURSE PRACTITIONER
Payer: COMMERCIAL

## 2025-05-02 DIAGNOSIS — E11.69 TYPE 2 DIABETES MELLITUS WITH OTHER SPECIFIED COMPLICATION, WITHOUT LONG-TERM CURRENT USE OF INSULIN (H): ICD-10-CM

## 2025-05-02 DIAGNOSIS — R06.02 EXERTIONAL SHORTNESS OF BREATH: ICD-10-CM

## 2025-05-02 PROCEDURE — 93325 DOPPLER ECHO COLOR FLOW MAPG: CPT | Mod: TC

## 2025-05-04 NOTE — RESULT ENCOUNTER NOTE
Jayshree Gonzalez,    Attached are your test results.  Stress echo was normal which is reassuring    Please contact us if you have any questions.    Balbina Armstrong, CNP

## 2025-05-08 ENCOUNTER — MYC REFILL (OUTPATIENT)
Dept: FAMILY MEDICINE | Facility: CLINIC | Age: 40
End: 2025-05-08
Payer: COMMERCIAL

## 2025-05-08 DIAGNOSIS — I10 ESSENTIAL HYPERTENSION WITH GOAL BLOOD PRESSURE LESS THAN 130/80: ICD-10-CM

## 2025-05-08 DIAGNOSIS — Z30.41 ENCOUNTER FOR SURVEILLANCE OF CONTRACEPTIVE PILLS: ICD-10-CM

## 2025-05-08 RX ORDER — LISINOPRIL 10 MG/1
10 TABLET ORAL DAILY
Qty: 90 TABLET | Refills: 1 | Status: SHIPPED | OUTPATIENT
Start: 2025-05-08

## 2025-05-08 RX ORDER — DESOGESTREL AND ETHINYL ESTRADIOL 0.15-0.03
1 KIT ORAL DAILY
Qty: 84 TABLET | Refills: 1 | Status: SHIPPED | OUTPATIENT
Start: 2025-05-08

## 2025-05-22 DIAGNOSIS — R06.02 EXERTIONAL SHORTNESS OF BREATH: ICD-10-CM

## 2025-05-22 LAB
DLCOUNC-%PRED-PRE: 92 %
DLCOUNC-PRE: 19.98 ML/MIN/MMHG
DLCOUNC-PRED: 21.62 ML/MIN/MMHG
ERV-%PRED-PRE: 27 %
ERV-PRE: 0.34 L
ERV-PRED: 1.23 L
EXPTIME-PRE: 5.96 SEC
FEF2575-%PRED-PRE: 97 %
FEF2575-PRE: 2.92 L/SEC
FEF2575-PRED: 2.99 L/SEC
FEFMAX-%PRED-PRE: 90 %
FEFMAX-PRE: 6.33 L/SEC
FEFMAX-PRED: 7.02 L/SEC
FEV1-%PRED-PRE: 105 %
FEV1-PRE: 2.96 L
FEV1FEV6-PRE: 80 %
FEV1FEV6-PRED: 84 %
FEV1FVC-PRE: 80 %
FEV1FVC-PRED: 83 %
FEV1SVC-PRE: 82 %
FEV1SVC-PRED: 79 %
FIFMAX-PRE: 3.15 L/SEC
FRCPLETH-%PRED-PRE: 67 %
FRCPLETH-PRE: 1.93 L
FRCPLETH-PRED: 2.84 L
FVC-%PRED-PRE: 108 %
FVC-PRE: 3.72 L
FVC-PRED: 3.42 L
IC-%PRED-PRE: 132 %
IC-PRE: 3.27 L
IC-PRED: 2.47 L
RVPLETH-%PRED-PRE: 97 %
RVPLETH-PRE: 1.59 L
RVPLETH-PRED: 1.63 L
TLCPLETH-%PRED-PRE: 103 %
TLCPLETH-PRE: 5.19 L
TLCPLETH-PRED: 5.02 L
VA-%PRED-PRE: 94 %
VA-PRE: 4.68 L
VC-%PRED-PRE: 101 %
VC-PRE: 3.61 L
VC-PRED: 3.55 L

## 2025-06-06 PROBLEM — E78.2 MIXED HYPERLIPIDEMIA: Status: ACTIVE | Noted: 2025-06-06

## 2025-06-13 ENCOUNTER — RESULTS FOLLOW-UP (OUTPATIENT)
Dept: CARDIOLOGY | Facility: CLINIC | Age: 40
End: 2025-06-13

## 2025-06-13 DIAGNOSIS — E78.2 MIXED HYPERLIPIDEMIA: ICD-10-CM

## 2025-06-13 DIAGNOSIS — E78.1 HYPERTRIGLYCERIDEMIA: Primary | ICD-10-CM

## 2025-06-25 ENCOUNTER — TELEPHONE (OUTPATIENT)
Dept: NEUROSURGERY | Facility: CLINIC | Age: 40
End: 2025-06-25
Payer: COMMERCIAL

## 2025-06-25 NOTE — TELEPHONE ENCOUNTER
Called patient and scheduled appointment w/ Kassieyoly Tuttle w/ an MRA prior per Lynnette Burns via [employer] message. -KB

## 2025-07-12 ENCOUNTER — HEALTH MAINTENANCE LETTER (OUTPATIENT)
Age: 40
End: 2025-07-12

## 2025-07-14 ENCOUNTER — ANCILLARY PROCEDURE (OUTPATIENT)
Dept: MRI IMAGING | Facility: CLINIC | Age: 40
End: 2025-07-14
Attending: NEUROLOGICAL SURGERY
Payer: COMMERCIAL

## 2025-07-14 DIAGNOSIS — I67.1 BRAIN ANEURYSM: ICD-10-CM

## 2025-07-14 PROCEDURE — 70544 MR ANGIOGRAPHY HEAD W/O DYE: CPT | Performed by: RADIOLOGY

## 2025-07-18 ENCOUNTER — HOSPITAL ENCOUNTER (OUTPATIENT)
Dept: CARDIOLOGY | Facility: CLINIC | Age: 40
Discharge: HOME | End: 2025-07-18
Attending: INTERNAL MEDICINE | Admitting: INTERNAL MEDICINE
Payer: COMMERCIAL

## 2025-07-18 VITALS — HEART RATE: 76 BPM | SYSTOLIC BLOOD PRESSURE: 121 MMHG | DIASTOLIC BLOOD PRESSURE: 86 MMHG

## 2025-07-18 DIAGNOSIS — E11.69 TYPE 2 DIABETES MELLITUS WITH OTHER SPECIFIED COMPLICATION, WITHOUT LONG-TERM CURRENT USE OF INSULIN (H): ICD-10-CM

## 2025-07-18 DIAGNOSIS — R06.02 EXERTIONAL SHORTNESS OF BREATH: ICD-10-CM

## 2025-07-18 LAB
CREAT BLD-MCNC: 0.8 MG/DL (ref 0.5–1)
EGFRCR SERPLBLD CKD-EPI 2021: >60 ML/MIN/1.73M2

## 2025-07-18 PROCEDURE — 250N000013 HC RX MED GY IP 250 OP 250 PS 637: Performed by: INTERNAL MEDICINE

## 2025-07-18 PROCEDURE — 75574 CT ANGIO HRT W/3D IMAGE: CPT | Mod: 26 | Performed by: INTERNAL MEDICINE

## 2025-07-18 PROCEDURE — 82565 ASSAY OF CREATININE: CPT

## 2025-07-18 PROCEDURE — 250N000011 HC RX IP 250 OP 636: Performed by: INTERNAL MEDICINE

## 2025-07-18 PROCEDURE — 75574 CT ANGIO HRT W/3D IMAGE: CPT

## 2025-07-18 PROCEDURE — 250N000009 HC RX 250: Performed by: INTERNAL MEDICINE

## 2025-07-18 RX ORDER — DILTIAZEM HYDROCHLORIDE 5 MG/ML
10-15 INJECTION INTRAVENOUS
Status: DISCONTINUED | OUTPATIENT
Start: 2025-07-18 | End: 2025-07-19 | Stop reason: HOSPADM

## 2025-07-18 RX ORDER — METOPROLOL TARTRATE 1 MG/ML
5-20 INJECTION, SOLUTION INTRAVENOUS
Status: DISCONTINUED | OUTPATIENT
Start: 2025-07-18 | End: 2025-07-19 | Stop reason: HOSPADM

## 2025-07-18 RX ORDER — ONDANSETRON 2 MG/ML
4 INJECTION INTRAMUSCULAR; INTRAVENOUS
Status: DISCONTINUED | OUTPATIENT
Start: 2025-07-18 | End: 2025-07-19 | Stop reason: HOSPADM

## 2025-07-18 RX ORDER — NITROGLYCERIN 0.4 MG/1
0.4 TABLET SUBLINGUAL
Status: DISCONTINUED | OUTPATIENT
Start: 2025-07-18 | End: 2025-07-19 | Stop reason: HOSPADM

## 2025-07-18 RX ORDER — IVABRADINE 5 MG/1
5-15 TABLET, FILM COATED ORAL
Status: COMPLETED | OUTPATIENT
Start: 2025-07-18 | End: 2025-07-18

## 2025-07-18 RX ORDER — METOPROLOL TARTRATE 25 MG/1
25-100 TABLET, FILM COATED ORAL
Status: COMPLETED | OUTPATIENT
Start: 2025-07-18 | End: 2025-07-18

## 2025-07-18 RX ORDER — IOPAMIDOL 755 MG/ML
500 INJECTION, SOLUTION INTRAVASCULAR ONCE
Status: COMPLETED | OUTPATIENT
Start: 2025-07-18 | End: 2025-07-18

## 2025-07-18 RX ORDER — DILTIAZEM HCL 60 MG
120 TABLET ORAL
Status: DISCONTINUED | OUTPATIENT
Start: 2025-07-18 | End: 2025-07-19 | Stop reason: HOSPADM

## 2025-07-18 RX ORDER — LIDOCAINE 40 MG/G
CREAM TOPICAL
Status: DISCONTINUED | OUTPATIENT
Start: 2025-07-18 | End: 2025-07-19 | Stop reason: HOSPADM

## 2025-07-18 RX ADMIN — IVABRADINE 15 MG: 5 TABLET, FILM COATED ORAL at 11:03

## 2025-07-18 RX ADMIN — METOPROLOL TARTRATE 50 MG: 50 TABLET, FILM COATED ORAL at 11:03

## 2025-07-18 RX ADMIN — METOPROLOL TARTRATE 10 MG: 5 INJECTION INTRAVENOUS at 12:08

## 2025-07-18 RX ADMIN — IOPAMIDOL 120 ML: 755 INJECTION, SOLUTION INTRAVENOUS at 13:39

## 2025-07-18 RX ADMIN — METOPROLOL TARTRATE 10 MG: 5 INJECTION INTRAVENOUS at 12:11

## 2025-07-18 RX ADMIN — METOPROLOL TARTRATE 10 MG: 5 INJECTION INTRAVENOUS at 12:18

## 2025-07-18 RX ADMIN — METOPROLOL TARTRATE 10 MG: 5 INJECTION INTRAVENOUS at 12:14

## 2025-07-18 RX ADMIN — NITROGLYCERIN 0.4 MG: 0.4 TABLET SUBLINGUAL at 13:01

## 2025-07-23 ENCOUNTER — VIRTUAL VISIT (OUTPATIENT)
Dept: NEUROSURGERY | Facility: CLINIC | Age: 40
End: 2025-07-23
Payer: COMMERCIAL

## 2025-07-23 DIAGNOSIS — I67.1 BRAIN ANEURYSM: Primary | ICD-10-CM

## 2025-07-23 PROCEDURE — 98005 SYNCH AUDIO-VIDEO EST LOW 20: CPT | Performed by: NURSE PRACTITIONER

## 2025-07-23 ASSESSMENT — PAIN SCALES - GENERAL: PAINLEVEL_OUTOF10: NO PAIN (0)

## 2025-07-23 NOTE — LETTER
2025       RE: Nancie Gonzalez  [address]  [facility] [address]     Dear Colleague,    Thank you for referring your patient, Nancie Gonzalez, to the [facility] [place] at [facility]. Please see a copy of my visit note below.    [facility]  Department of Neurosurgery      Name: Nancie Gonzalez  MRN: [id number]  Age: 39 year old  : 1985  Referring provider: Kassie Tuttle  2025      Chief Complaint:   Cerebral aneurysm, nonruptured  Follow-up after imaging    History of Present Illness:   Nancie Gonzalez is a 39 year old female with a history of hypertension, left cavernous ICA aneurysm who is seen today for a follow-up after imaging.    Incidental finding of left ICA cavernous aneurysm in May 2023 when she presented to the ER with dizziness and limb heaviness.  As this is a cavernous aneurysm with very low risk of rupture, we recommended a follow-up MRA in 2 years.    Today I had a video visit with the patient.  She denies any new neurological concerns.  She recently had some increase in blood pressures and metoprolol was added along with lisinopril.  Blood pressures has been stable since then.    Patient completed a recent brain MRA.  Please see the results below.      Review of Systems:   Pertinent items are noted in HPI or as in patient entered ROS below, remainder of complete ROS is negative.        No data to display                 Physical Exam:   There were no vitals taken for this visit.   General: No acute distress.    Neuro: The patient is fully oriented. Speech is normal. Psych: Normal mood and affect. Behavior is normal.        Imagin2025 MRA Brain:  IMPRESSION:  1.  No significant interval change in the previously demonstrated laterally directed focal outpouching of the proximal cavernous segment of the left internal carotid artery measuring approximately 1.5-2 mm. This may  represent a small aneurysm or   infundibulum.  2.  No other findings concerning for intracranial aneurysm or high flow vascular malformation.  3.  No high-grade proximal arterial stenosis/occlusion of the major intracranial arteries.      Assessment:  Cerebral aneurysm, nonruptured  Follow-up after imaging    Plan:  Recent brain MRA shows essentially stable left cavernous ICA aneurysm measuring 1.5 to 2 mm.  We will continue with serial monitoring.  Patient to follow-up with me in 2 years with brain MRA.       I spent 10 minutes on patient care activities related to this encounter on the date of service, including time spent reviewing the chart, obtaining history and examination and in counseling the patient, and in documentation in the electronic medical record.      Kassie JOAQUIN CNP  Department of Neurosurgery      Again, thank you for allowing me to participate in the care of your patient.      Sincerely,    EMANI Galeas CNP

## 2025-07-23 NOTE — PROGRESS NOTES
[facility]  Department of Neurosurgery      Name: Nancie Gonzalez  MRN: [id number]  Age: 39 year old  : 1985  Referring provider: Kassie Tuttle  2025      Chief Complaint:   Cerebral aneurysm, nonruptured  Follow-up after imaging    History of Present Illness:   Nancie Gonzalez is a 39 year old female with a history of hypertension, left cavernous ICA aneurysm who is seen today for a follow-up after imaging.    Incidental finding of left ICA cavernous aneurysm in May 2023 when she presented to the ER with dizziness and limb heaviness.  As this is a cavernous aneurysm with very low risk of rupture, we recommended a follow-up MRA in 2 years.    Today I had a video visit with the patient.  She denies any new neurological concerns.  She recently had some increase in blood pressures and metoprolol was added along with lisinopril.  Blood pressures has been stable since then.    Patient completed a recent brain MRA.  Please see the results below.      Review of Systems:   Pertinent items are noted in HPI or as in patient entered ROS below, remainder of complete ROS is negative.        No data to display                 Physical Exam:   There were no vitals taken for this visit.   General: No acute distress.    Neuro: The patient is fully oriented. Speech is normal. Psych: Normal mood and affect. Behavior is normal.        Imagin2025 MRA Brain:  IMPRESSION:  1.  No significant interval change in the previously demonstrated laterally directed focal outpouching of the proximal cavernous segment of the left internal carotid artery measuring approximately 1.5-2 mm. This may represent a small aneurysm or   infundibulum.  2.  No other findings concerning for intracranial aneurysm or high flow vascular malformation.  3.  No high-grade proximal arterial stenosis/occlusion of the major intracranial arteries.      Assessment:  Cerebral aneurysm, nonruptured  Follow-up after  imaging    Plan:  Recent brain MRA shows essentially stable left cavernous ICA aneurysm measuring 1.5 to 2 mm.  We will continue with serial monitoring.  Patient to follow-up with me in 2 years with brain MRA.       I spent 10 minutes on patient care activities related to this encounter on the date of service, including time spent reviewing the chart, obtaining history and examination and in counseling the patient, and in documentation in the electronic medical record.      Kassie JOAQUIN CNP  Department of Neurosurgery

## 2025-07-25 ENCOUNTER — ANCILLARY PROCEDURE (OUTPATIENT)
Dept: GENERAL RADIOLOGY | Facility: CLINIC | Age: 40
End: 2025-07-25
Attending: NURSE PRACTITIONER
Payer: COMMERCIAL

## 2025-07-25 DIAGNOSIS — J98.11 ATELECTASIS, BILATERAL: ICD-10-CM

## 2025-07-25 PROCEDURE — 71046 X-RAY EXAM CHEST 2 VIEWS: CPT | Mod: TC | Performed by: RADIOLOGY

## 2025-08-12 RX ORDER — FENOFIBRATE 145 MG/1
145 TABLET, FILM COATED ORAL DAILY
Qty: 30 TABLET | Refills: 4 | Status: SHIPPED | OUTPATIENT
Start: 2025-08-12

## (undated) RX ORDER — METOPROLOL TARTRATE 50 MG
TABLET ORAL
Status: DISPENSED
Start: 2025-07-18

## (undated) RX ORDER — IVABRADINE 5 MG/1
TABLET, FILM COATED ORAL
Status: DISPENSED
Start: 2025-07-18

## (undated) RX ORDER — METOPROLOL TARTRATE 1 MG/ML
INJECTION, SOLUTION INTRAVENOUS
Status: DISPENSED
Start: 2025-07-18